# Patient Record
Sex: FEMALE | Race: OTHER | Employment: OTHER | URBAN - METROPOLITAN AREA
[De-identification: names, ages, dates, MRNs, and addresses within clinical notes are randomized per-mention and may not be internally consistent; named-entity substitution may affect disease eponyms.]

---

## 2021-09-16 ENCOUNTER — HOSPITAL ENCOUNTER (OUTPATIENT)
Age: 76
Setting detail: OBSERVATION
Discharge: HOME | End: 2021-09-18
Attending: EMERGENCY MEDICINE | Admitting: INTERNAL MEDICINE
Payer: MEDICAID

## 2021-09-16 ENCOUNTER — APPOINTMENT (OUTPATIENT)
Dept: CT IMAGING | Age: 76
End: 2021-09-16
Payer: MEDICAID

## 2021-09-16 ENCOUNTER — APPOINTMENT (OUTPATIENT)
Dept: GENERAL RADIOLOGY | Age: 76
End: 2021-09-16
Payer: MEDICAID

## 2021-09-16 DIAGNOSIS — R07.9 CHEST PAIN, UNSPECIFIED TYPE: ICD-10-CM

## 2021-09-16 DIAGNOSIS — I16.0 HYPERTENSIVE URGENCY: Primary | ICD-10-CM

## 2021-09-16 LAB
A/G RATIO: 1.2 (ref 1.1–2.2)
ALBUMIN SERPL-MCNC: 4.6 G/DL (ref 3.4–5)
ALP BLD-CCNC: 88 U/L (ref 40–129)
ALT SERPL-CCNC: 10 U/L (ref 10–40)
ANION GAP SERPL CALCULATED.3IONS-SCNC: 10 MMOL/L (ref 3–16)
AST SERPL-CCNC: 22 U/L (ref 15–37)
BACTERIA: ABNORMAL /HPF
BASOPHILS ABSOLUTE: 0 K/UL (ref 0–0.2)
BASOPHILS RELATIVE PERCENT: 0.9 %
BILIRUB SERPL-MCNC: 0.5 MG/DL (ref 0–1)
BILIRUBIN URINE: NEGATIVE
BLOOD, URINE: ABNORMAL
BUN BLDV-MCNC: 10 MG/DL (ref 7–20)
CALCIUM SERPL-MCNC: 9.4 MG/DL (ref 8.3–10.6)
CHLORIDE BLD-SCNC: 99 MMOL/L (ref 99–110)
CLARITY: CLEAR
CO2: 28 MMOL/L (ref 21–32)
COLOR: YELLOW
CREAT SERPL-MCNC: 0.7 MG/DL (ref 0.6–1.2)
EOSINOPHILS ABSOLUTE: 0.1 K/UL (ref 0–0.6)
EOSINOPHILS RELATIVE PERCENT: 2.4 %
EPITHELIAL CELLS, UA: ABNORMAL /HPF (ref 0–5)
GFR AFRICAN AMERICAN: >60
GFR NON-AFRICAN AMERICAN: >60
GLOBULIN: 3.7 G/DL
GLUCOSE BLD-MCNC: 98 MG/DL (ref 70–99)
GLUCOSE URINE: NEGATIVE MG/DL
HCT VFR BLD CALC: 42 % (ref 36–48)
HEMOGLOBIN: 14.1 G/DL (ref 12–16)
KETONES, URINE: NEGATIVE MG/DL
LEUKOCYTE ESTERASE, URINE: NEGATIVE
LIPASE: 31 U/L (ref 13–60)
LYMPHOCYTES ABSOLUTE: 1.5 K/UL (ref 1–5.1)
LYMPHOCYTES RELATIVE PERCENT: 32.8 %
MCH RBC QN AUTO: 28.8 PG (ref 26–34)
MCHC RBC AUTO-ENTMCNC: 33.6 G/DL (ref 31–36)
MCV RBC AUTO: 85.7 FL (ref 80–100)
MICROSCOPIC EXAMINATION: YES
MONOCYTES ABSOLUTE: 0.6 K/UL (ref 0–1.3)
MONOCYTES RELATIVE PERCENT: 13 %
NEUTROPHILS ABSOLUTE: 2.3 K/UL (ref 1.7–7.7)
NEUTROPHILS RELATIVE PERCENT: 50.9 %
NITRITE, URINE: POSITIVE
PDW BLD-RTO: 13.4 % (ref 12.4–15.4)
PH UA: 7 (ref 5–8)
PLATELET # BLD: 270 K/UL (ref 135–450)
PMV BLD AUTO: 8.6 FL (ref 5–10.5)
POTASSIUM REFLEX MAGNESIUM: 3.9 MMOL/L (ref 3.5–5.1)
PRO-BNP: 230 PG/ML (ref 0–449)
PROTEIN UA: NEGATIVE MG/DL
RBC # BLD: 4.89 M/UL (ref 4–5.2)
RBC UA: ABNORMAL /HPF (ref 0–4)
SODIUM BLD-SCNC: 137 MMOL/L (ref 136–145)
SPECIFIC GRAVITY UA: 1.01 (ref 1–1.03)
TOTAL PROTEIN: 8.3 G/DL (ref 6.4–8.2)
TROPONIN: <0.01 NG/ML
URINE REFLEX TO CULTURE: ABNORMAL
URINE TYPE: ABNORMAL
UROBILINOGEN, URINE: 0.2 E.U./DL
WBC # BLD: 4.6 K/UL (ref 4–11)
WBC UA: ABNORMAL /HPF (ref 0–5)

## 2021-09-16 PROCEDURE — 6370000000 HC RX 637 (ALT 250 FOR IP): Performed by: EMERGENCY MEDICINE

## 2021-09-16 PROCEDURE — 85025 COMPLETE CBC W/AUTO DIFF WBC: CPT

## 2021-09-16 PROCEDURE — 71045 X-RAY EXAM CHEST 1 VIEW: CPT

## 2021-09-16 PROCEDURE — 93005 ELECTROCARDIOGRAM TRACING: CPT | Performed by: EMERGENCY MEDICINE

## 2021-09-16 PROCEDURE — 99284 EMERGENCY DEPT VISIT MOD MDM: CPT

## 2021-09-16 PROCEDURE — 70450 CT HEAD/BRAIN W/O DYE: CPT

## 2021-09-16 PROCEDURE — 83880 ASSAY OF NATRIURETIC PEPTIDE: CPT

## 2021-09-16 PROCEDURE — 80053 COMPREHEN METABOLIC PANEL: CPT

## 2021-09-16 PROCEDURE — 84484 ASSAY OF TROPONIN QUANT: CPT

## 2021-09-16 PROCEDURE — 81001 URINALYSIS AUTO W/SCOPE: CPT

## 2021-09-16 PROCEDURE — 83690 ASSAY OF LIPASE: CPT

## 2021-09-16 RX ORDER — ASPIRIN 325 MG
325 TABLET ORAL ONCE
Status: COMPLETED | OUTPATIENT
Start: 2021-09-16 | End: 2021-09-16

## 2021-09-16 RX ORDER — NITROGLYCERIN 0.4 MG/1
0.4 TABLET SUBLINGUAL EVERY 5 MIN PRN
Status: DISCONTINUED | OUTPATIENT
Start: 2021-09-16 | End: 2021-09-18 | Stop reason: HOSPADM

## 2021-09-16 RX ADMIN — ASPIRIN 325 MG: 325 TABLET ORAL at 23:51

## 2021-09-16 ASSESSMENT — PAIN DESCRIPTION - LOCATION: LOCATION: ABDOMEN;CHEST;HEAD

## 2021-09-16 ASSESSMENT — PAIN DESCRIPTION - FREQUENCY: FREQUENCY: INTERMITTENT

## 2021-09-16 ASSESSMENT — PAIN DESCRIPTION - DESCRIPTORS: DESCRIPTORS: PRESSURE

## 2021-09-16 ASSESSMENT — PAIN SCALES - GENERAL: PAINLEVEL_OUTOF10: 7

## 2021-09-16 ASSESSMENT — PAIN DESCRIPTION - ORIENTATION: ORIENTATION: MID

## 2021-09-17 ENCOUNTER — TELEPHONE (OUTPATIENT)
Dept: CARDIOLOGY | Age: 76
End: 2021-09-17

## 2021-09-17 ENCOUNTER — APPOINTMENT (OUTPATIENT)
Dept: NUCLEAR MEDICINE | Age: 76
End: 2021-09-17
Payer: MEDICAID

## 2021-09-17 DIAGNOSIS — I48.91 ATRIAL FIBRILLATION, UNSPECIFIED TYPE (HCC): Primary | ICD-10-CM

## 2021-09-17 PROBLEM — R07.9 CHEST PAIN IN ADULT: Status: ACTIVE | Noted: 2021-09-17

## 2021-09-17 PROBLEM — I10 HTN (HYPERTENSION): Status: ACTIVE | Noted: 2021-09-17

## 2021-09-17 LAB
EKG ATRIAL RATE: 267 BPM
EKG ATRIAL RATE: 70 BPM
EKG DIAGNOSIS: NORMAL
EKG DIAGNOSIS: NORMAL
EKG P AXIS: 49 DEGREES
EKG P-R INTERVAL: 150 MS
EKG Q-T INTERVAL: 400 MS
EKG Q-T INTERVAL: 422 MS
EKG QRS DURATION: 82 MS
EKG QRS DURATION: 86 MS
EKG QTC CALCULATION (BAZETT): 403 MS
EKG QTC CALCULATION (BAZETT): 432 MS
EKG R AXIS: -46 DEGREES
EKG R AXIS: -50 DEGREES
EKG T AXIS: 178 DEGREES
EKG T AXIS: 61 DEGREES
EKG VENTRICULAR RATE: 55 BPM
EKG VENTRICULAR RATE: 70 BPM
LV EF: 75 %
LVEF MODALITY: NORMAL
TROPONIN: <0.01 NG/ML
TROPONIN: <0.01 NG/ML

## 2021-09-17 PROCEDURE — 93017 CV STRESS TEST TRACING ONLY: CPT

## 2021-09-17 PROCEDURE — 93010 ELECTROCARDIOGRAM REPORT: CPT | Performed by: INTERNAL MEDICINE

## 2021-09-17 PROCEDURE — G0378 HOSPITAL OBSERVATION PER HR: HCPCS

## 2021-09-17 PROCEDURE — 6370000000 HC RX 637 (ALT 250 FOR IP): Performed by: INTERNAL MEDICINE

## 2021-09-17 PROCEDURE — A9502 TC99M TETROFOSMIN: HCPCS | Performed by: INTERNAL MEDICINE

## 2021-09-17 PROCEDURE — 3430000000 HC RX DIAGNOSTIC RADIOPHARMACEUTICAL: Performed by: INTERNAL MEDICINE

## 2021-09-17 PROCEDURE — 78452 HT MUSCLE IMAGE SPECT MULT: CPT

## 2021-09-17 PROCEDURE — 96372 THER/PROPH/DIAG INJ SC/IM: CPT

## 2021-09-17 PROCEDURE — 2580000003 HC RX 258: Performed by: INTERNAL MEDICINE

## 2021-09-17 PROCEDURE — 6360000002 HC RX W HCPCS: Performed by: INTERNAL MEDICINE

## 2021-09-17 PROCEDURE — 84484 ASSAY OF TROPONIN QUANT: CPT

## 2021-09-17 PROCEDURE — 93005 ELECTROCARDIOGRAM TRACING: CPT | Performed by: INTERNAL MEDICINE

## 2021-09-17 PROCEDURE — 99244 OFF/OP CNSLTJ NEW/EST MOD 40: CPT | Performed by: INTERNAL MEDICINE

## 2021-09-17 RX ORDER — ONDANSETRON 4 MG/1
4 TABLET, ORALLY DISINTEGRATING ORAL EVERY 8 HOURS PRN
Status: DISCONTINUED | OUTPATIENT
Start: 2021-09-17 | End: 2021-09-18 | Stop reason: HOSPADM

## 2021-09-17 RX ORDER — SODIUM CHLORIDE 0.9 % (FLUSH) 0.9 %
5-40 SYRINGE (ML) INJECTION EVERY 12 HOURS SCHEDULED
Status: DISCONTINUED | OUTPATIENT
Start: 2021-09-17 | End: 2021-09-18 | Stop reason: HOSPADM

## 2021-09-17 RX ORDER — ACETAMINOPHEN 325 MG/1
650 TABLET ORAL EVERY 6 HOURS PRN
Status: DISCONTINUED | OUTPATIENT
Start: 2021-09-17 | End: 2021-09-18 | Stop reason: HOSPADM

## 2021-09-17 RX ORDER — POLYETHYLENE GLYCOL 3350 17 G/17G
17 POWDER, FOR SOLUTION ORAL DAILY PRN
Status: DISCONTINUED | OUTPATIENT
Start: 2021-09-17 | End: 2021-09-18 | Stop reason: HOSPADM

## 2021-09-17 RX ORDER — AMLODIPINE BESYLATE 5 MG/1
5 TABLET ORAL DAILY
Status: DISCONTINUED | OUTPATIENT
Start: 2021-09-17 | End: 2021-09-17

## 2021-09-17 RX ORDER — AMLODIPINE BESYLATE 5 MG/1
10 TABLET ORAL DAILY
Status: DISCONTINUED | OUTPATIENT
Start: 2021-09-18 | End: 2021-09-18 | Stop reason: HOSPADM

## 2021-09-17 RX ORDER — ATORVASTATIN CALCIUM 10 MG/1
40 TABLET, FILM COATED ORAL NIGHTLY
Status: DISCONTINUED | OUTPATIENT
Start: 2021-09-17 | End: 2021-09-18 | Stop reason: HOSPADM

## 2021-09-17 RX ORDER — ONDANSETRON 2 MG/ML
4 INJECTION INTRAMUSCULAR; INTRAVENOUS EVERY 6 HOURS PRN
Status: DISCONTINUED | OUTPATIENT
Start: 2021-09-17 | End: 2021-09-18 | Stop reason: HOSPADM

## 2021-09-17 RX ORDER — NITROGLYCERIN 0.4 MG/1
0.4 TABLET SUBLINGUAL EVERY 5 MIN PRN
Status: DISCONTINUED | OUTPATIENT
Start: 2021-09-17 | End: 2021-09-18 | Stop reason: HOSPADM

## 2021-09-17 RX ORDER — ASPIRIN 81 MG/1
81 TABLET, CHEWABLE ORAL DAILY
Status: DISCONTINUED | OUTPATIENT
Start: 2021-09-17 | End: 2021-09-17

## 2021-09-17 RX ORDER — ACETAMINOPHEN 650 MG/1
650 SUPPOSITORY RECTAL EVERY 6 HOURS PRN
Status: DISCONTINUED | OUTPATIENT
Start: 2021-09-17 | End: 2021-09-18 | Stop reason: HOSPADM

## 2021-09-17 RX ORDER — SODIUM CHLORIDE 9 MG/ML
25 INJECTION, SOLUTION INTRAVENOUS PRN
Status: DISCONTINUED | OUTPATIENT
Start: 2021-09-17 | End: 2021-09-18 | Stop reason: HOSPADM

## 2021-09-17 RX ORDER — SODIUM CHLORIDE 0.9 % (FLUSH) 0.9 %
5-40 SYRINGE (ML) INJECTION PRN
Status: DISCONTINUED | OUTPATIENT
Start: 2021-09-17 | End: 2021-09-18 | Stop reason: HOSPADM

## 2021-09-17 RX ADMIN — Medication 10 ML: at 09:19

## 2021-09-17 RX ADMIN — ATORVASTATIN CALCIUM 40 MG: 10 TABLET, FILM COATED ORAL at 20:11

## 2021-09-17 RX ADMIN — APIXABAN 5 MG: 5 TABLET, FILM COATED ORAL at 20:11

## 2021-09-17 RX ADMIN — AMLODIPINE BESYLATE 5 MG: 5 TABLET ORAL at 00:50

## 2021-09-17 RX ADMIN — REGADENOSON 0.4 MG: 0.08 INJECTION, SOLUTION INTRAVENOUS at 11:35

## 2021-09-17 RX ADMIN — ENOXAPARIN SODIUM 40 MG: 40 INJECTION SUBCUTANEOUS at 09:16

## 2021-09-17 RX ADMIN — AMLODIPINE BESYLATE 5 MG: 5 TABLET ORAL at 09:17

## 2021-09-17 RX ADMIN — NITROGLYCERIN 0.4 MG: 0.4 TABLET, ORALLY DISINTEGRATING SUBLINGUAL at 11:53

## 2021-09-17 RX ADMIN — Medication 10 ML: at 20:11

## 2021-09-17 RX ADMIN — ATORVASTATIN CALCIUM 40 MG: 10 TABLET, FILM COATED ORAL at 00:50

## 2021-09-17 RX ADMIN — TETROFOSMIN 10.9 MILLICURIE: 1.38 INJECTION, POWDER, LYOPHILIZED, FOR SOLUTION INTRAVENOUS at 10:35

## 2021-09-17 RX ADMIN — ACETAMINOPHEN 650 MG: 325 TABLET ORAL at 00:50

## 2021-09-17 RX ADMIN — TETROFOSMIN 32.3 MILLICURIE: 1.38 INJECTION, POWDER, LYOPHILIZED, FOR SOLUTION INTRAVENOUS at 11:35

## 2021-09-17 RX ADMIN — ASPIRIN 81 MG: 81 TABLET, CHEWABLE ORAL at 09:15

## 2021-09-17 ASSESSMENT — PAIN SCALES - GENERAL
PAINLEVEL_OUTOF10: 0
PAINLEVEL_OUTOF10: 7

## 2021-09-17 NOTE — ED PROVIDER NOTES
CHIEF COMPLAINT  Chest Pain (lower chest/epigastric pain began yesterday. Pain radiates to her back. Pt states pain feels like pressure. Pt did not use OTC meds at home. Pt denies cardiac hx. Pt states she has not been taking BP meds for about 2 month, pt ran out when she came back from [facility] and didn't fill it. ) and Headache (Headache began yesterday. )      HISTORY OF PRESENT Luis Johnson is a 78 y.o. female with a history of hypertension on amlodipine, Benzapril who presents emergency department for evaluation of chest pain. She is accompanied by her son who translates for her. According to the patient, she has had substernal chest pain with radiation towards the epigastric area for the past 2 days. There is no associated nausea, vomiting. There is no ripping or tearing component of the chest pain. Patient states that she has not been taking her blood pressure medications for the past 2 months because it ran out. She states that when the chest pain started yesterday she also has had left-sided headache. She states that there is associated blurred vision. .  Son states that there is no changes in her mental status or confusion. She denies any weakness in her extremities. She reports no previous cardiac history, CHF, stress testing of the heart. No other complaints, modifying factors or associated symptoms. I have reviewed the following from the nursing documentation. No past medical history on file. No past surgical history on file. No family history on file.   Social History     Socioeconomic History    Marital status:      Spouse name: Not on file    Number of children: Not on file    Years of education: Not on file    Highest education level: Not on file   Occupational History    Not on file   Tobacco Use    Smoking status: Not on file   Substance and Sexual Activity    Alcohol use: Not on file    Drug use: Not on file    Sexual activity: Not on file Other Topics Concern    Not on file   Social History Narrative    Not on file     Social Determinants of Health     Financial Resource Strain:     Difficulty of Paying Living Expenses:    Food Insecurity:     Worried About Running Out of Food in the Last Year:     [address] in the Last Year:    Transportation Needs:     Lack of Transportation (Medical):  Lack of Transportation (Non-Medical):    Physical Activity:     Days of Exercise per Week:     Minutes of Exercise per Session:    Stress:     Feeling of Stress :    Social Connections:     Frequency of Communication with Friends and Family:     Frequency of Social Gatherings with Friends and Family:     Attends Zoroastrianism Services:     Active Member of Clubs or Organizations:     Attends Club or Organization Meetings:     Marital Status:    Intimate Partner Violence:     Fear of Current or Ex-Partner:     Emotionally Abused:     Physically Abused:     Sexually Abused:      Current Facility-Administered Medications   Medication Dose Route Frequency Provider Last Rate Last Admin    nitroGLYCERIN (NITROSTAT) SL tablet 0.4 mg  0.4 mg SubLINGual Q5 Min PRN Tonia Ibarra MD        aspirin tablet 325 mg  325 mg Oral Once Tonia Ibarra MD         No current outpatient medications on file. No Known Allergies    REVIEW OF SYSTEMS  10 systems reviewed, pertinent positives per HPI otherwise noted to be negative. PHYSICAL EXAM  BP (!) 163/97   Pulse 60   Temp 97.2 °F (36.2 °C)   Resp 18   SpO2 99%   GENERAL APPEARANCE: Awake and alert. Cooperative. No acute distress. HEAD: Normocephalic. Atraumatic. EYES: PERRL. EOM's grossly intact. ENT: Mucous membranes are moist.   NECK: Supple, trachea midline. No significant lymphadenopathy  HEART: RRR. No harsh murmurs. Intact radial pulses 2+ bilaterally. LUNGS: Respirations unlabored without accessory muscle use. CTAB. Good air exchange. No wheezes, rales, or rhonchi.   Speaking comfortably in full sentences. ABDOMEN: Soft. Non-distended. Non-tender. No guarding or rebound. EXTREMITIES: No peripheral edema. No acute deformities. SKIN: Warm and dry. No acute rashes. NEUROLOGICAL: Alert and oriented X 3. CN II-XII intact. No gross facial drooping. Strength 5/5, sensation intact. No pronator drift. Normal coordination. Gait normal.   PSYCHIATRIC: Normal mood and affect. LABS  I have reviewed all labs for this visit.    Results for orders placed or performed during the hospital encounter of 09/16/21   CBC Auto Differential   Result Value Ref Range    WBC 4.6 4.0 - 11.0 K/uL    RBC 4.89 4.00 - 5.20 M/uL    Hemoglobin 14.1 12.0 - 16.0 g/dL    Hematocrit 42.0 36.0 - 48.0 %    MCV 85.7 80.0 - 100.0 fL    MCH 28.8 26.0 - 34.0 pg    MCHC 33.6 31.0 - 36.0 g/dL    RDW 13.4 12.4 - 15.4 %    Platelets 343 398 - 673 K/uL    MPV 8.6 5.0 - 10.5 fL    Neutrophils % 50.9 %    Lymphocytes % 32.8 %    Monocytes % 13.0 %    Eosinophils % 2.4 %    Basophils % 0.9 %    Neutrophils Absolute 2.3 1.7 - 7.7 K/uL    Lymphocytes Absolute 1.5 1.0 - 5.1 K/uL    Monocytes Absolute 0.6 0.0 - 1.3 K/uL    Eosinophils Absolute 0.1 0.0 - 0.6 K/uL    Basophils Absolute 0.0 0.0 - 0.2 K/uL   Comprehensive Metabolic Panel w/ Reflex to MG   Result Value Ref Range    Sodium 137 136 - 145 mmol/L    Potassium reflex Magnesium 3.9 3.5 - 5.1 mmol/L    Chloride 99 99 - 110 mmol/L    CO2 28 21 - 32 mmol/L    Anion Gap 10 3 - 16    Glucose 98 70 - 99 mg/dL    BUN 10 7 - 20 mg/dL    CREATININE 0.7 0.6 - 1.2 mg/dL    GFR Non-African American >60 >60    GFR African American >60 >60    Calcium 9.4 8.3 - 10.6 mg/dL    Total Protein 8.3 (H) 6.4 - 8.2 g/dL    Albumin 4.6 3.4 - 5.0 g/dL    Albumin/Globulin Ratio 1.2 1.1 - 2.2    Total Bilirubin 0.5 0.0 - 1.0 mg/dL    Alkaline Phosphatase 88 40 - 129 U/L    ALT 10 10 - 40 U/L    AST 22 15 - 37 U/L    Globulin 3.7 g/dL   Lipase   Result Value Ref Range    Lipase 31.0 13.0 - 60.0 U/L Troponin   Result Value Ref Range    Troponin <0.01 <0.01 ng/mL   Brain Natriuretic Peptide   Result Value Ref Range    Pro- 0 - 449 pg/mL   Urinalysis Reflex to Culture    Specimen: Urine, clean catch   Result Value Ref Range    Color, UA Yellow Straw/Yellow    Clarity, UA Clear Clear    Glucose, Ur Negative Negative mg/dL    Bilirubin Urine Negative Negative    Ketones, Urine Negative Negative mg/dL    Specific Gravity, UA 1.010 1.005 - 1.030    Blood, Urine SMALL (A) Negative    pH, UA 7.0 5.0 - 8.0    Protein, UA Negative Negative mg/dL    Urobilinogen, Urine 0.2 <2.0 E.U./dL    Nitrite, Urine POSITIVE (A) Negative    Leukocyte Esterase, Urine Negative Negative    Microscopic Examination YES     Urine Type NotGiven     Urine Reflex to Culture Not Indicated    Microscopic Urinalysis   Result Value Ref Range    WBC, UA 0-2 0 - 5 /HPF    RBC, UA 3-4 0 - 4 /HPF    Epithelial Cells, UA 0-1 0 - 5 /HPF    Bacteria, UA 2+ (A) None Seen /HPF       EKG  The Ekg interpreted by myself in the emergency department in the absence of a cardiologist.  normal sinus rhythm with a rate of 70  Axis is   Normal  QTc is  within an acceptable range  Intervals and Durations are unremarkable. No specific ST-T wave changes appreciated. There is nonspecific ST changes in leads aVL, 3, biphasic T waves in leads V3 through V6  No evidence of acute ischemia. No prior EKG available for comparison      RADIOLOGY  X-RAYS:  I have reviewed radiologic plain film image(s). ALL OTHER NON-PLAIN FILM IMAGES SUCH AS CT, ULTRASOUND AND MRI HAVE BEEN READ BY THE RADIOLOGIST. XR CHEST PORTABLE   Final Result   Mild cardiomegaly. Hypoinflation with no obvious acute pulmonary abnormality. CT Head WO Contrast   Final Result   Minimal atrophy and mild chronic microischemic disease in the deep white   matter with no acute abnormality seen. ED COURSE/MDM  Patient seen and evaluated. Old records reviewed.  Labs and imaging reviewed and results discussed with patient. This is a 57-year-old female presents emergency department for evaluation of hypertension, chest pain, blurred vision. Patient arrives with blood pressure elevated into the 190s. Repeat blood pressure when she was roomed is into the 200s. She is in no acute distress, no respiratory distress, no focal neurological deficits. ED evaluation included basic labs, cardiac panel, chest x-ray, CT head. Patient will receive full dose aspirin after head CT presuming that this is negative. Blood pressure improved without intervention to the 160s. Patient was given full dose aspirin. CT head was negative for acute intracranial process. Patient's heart score is moderately elevated at 5 secondary to age, risk factors, nonspecific EKG changes. Patient's chest pain is actually gone at this point in time and I have recommended patient to be admitted for continued evaluation for hypertensive urgency, chest pain rule out. Patient family agreeable with admission. CLINICAL IMPRESSION  1. Hypertensive urgency    2. Chest pain, unspecified type        Blood pressure (!) 163/97, pulse 60, temperature 97.2 °F (36.2 °C), resp. rate 18, SpO2 99 %. DISPOSITION  Alicia Ibarra was admitted in stable condition.       Rachna Villavicencio MD  09/16/21 7932

## 2021-09-17 NOTE — H&P
Hospital Medicine History & Physical      Patient:  Harris Cruz  :   1945  MRN:   [id number]  Date of Service: 21    Chief Complaint   Patient presents with    Chest Pain     lower chest/epigastric pain began yesterday. Pain radiates to her back. Pt states pain feels like pressure. Pt did not use OTC meds at home. Pt denies cardiac hx. Pt states she has not been taking BP meds for about 2 month, pt ran out when she came back from [place] and didn't fill it.  Headache     Headache began yesterday. HISTORY OF PRESENT ILLNESS:     Harris Cruz is a 78 y.o. female. She presents from home via private vehicle. She is [place] and speaks only Western Shayy. Her son accompanies her and assists with translation. She traveled to [facility] and returned two months ago. She has not been taking her blood pressure medications since returning. She relates she ran out because prior to her trip she lived in [place]. She was treated for HTN there but cannot recall the names of the medications she was taking at that time. She has been experiencing intermittent epigastric and substernal discomfort for the past 2 days. She does not think there has been variation with activity vs rest, breathing, or swallowing. She denies dysnpea, orthopnea, and PND. She does relate at times having a headache over the past 2 days associated with blurry vision. Review of Systems:  All pertinent positives and negatives are as noted in the HPI section. All other systems were reviewed and are negative. Past Medical History  HTN    History reviewed. No pertinent surgical history. Prior to Admission medications    Not on File   Not taking any medications    Allergies:   Patient has no known allergies. Social:  Does not smoke. Does not drink alcohol or abuse illicit drugs. History reviewed. No pertinent family history.     PHYSICAL EXAM:  I performed this physical intravenous contrast. Dose modulation, iterative reconstruction, and/or weight based adjustment of the mA/kV was utilized to reduce the radiation dose to as low as reasonably achievable. COMPARISON: None. HISTORY: ORDERING SYSTEM PROVIDED HISTORY: HTN, headache TECHNOLOGIST PROVIDED HISTORY: Reason for exam:->HTN, headache Has a \"code stroke\" or \"stroke alert\" been called? ->No Decision Support Exception - unselect if not a suspected or confirmed emergency medical condition->Emergency Medical Condition (MA) Reason for Exam: HTN, headache FINDINGS: BRAIN/VENTRICLES: The ventricles are borderline enlarged there is diffuse mild prominence of the cortical sulci. There is mild periventricular low density bilaterally. No intracranial hemorrhage or edema is seen. There is no extra-axial fluid collection or mass. The midline structures unremarkable ORBITS: The visualized portion of the orbits demonstrate no acute abnormality. SINUSES: The visualized paranasal sinuses and mastoid air cells demonstrate no acute abnormality. SOFT TISSUES/SKULL:  No acute abnormality of the visualized skull or soft tissues. Minimal atrophy and mild chronic microischemic disease in the deep white matter with no acute abnormality seen. XR CHEST PORTABLE    Result Date: 9/16/2021  EXAMINATION: ONE XRAY VIEW OF THE CHEST 9/16/2021 9:41 pm COMPARISON: None. HISTORY: ORDERING SYSTEM PROVIDED HISTORY: chest pain TECHNOLOGIST PROVIDED HISTORY: Reason for exam:->chest pain Reason for Exam: cp Acuity: Acute Type of Exam: Initial FINDINGS: The heart is mildly enlarged. The pulmonary vessels are normal.  The lungs are hypoinflated. No obvious consolidation or effusion is seen. The bones are intact. Mild cardiomegaly. Hypoinflation with no obvious acute pulmonary abnormality. I directly reviewed all recent imaging studies as well as pertinent prior studies. Radiology reports may or may not be available at the time of my review. EKG:  New and pertinent prior tracings were directly reviewed. My interpretation is as follows:  Normal sinus. Borderline LAFB. Active Hospital Problems    Diagnosis Date Noted    Chest pain in adult [R07.9] 09/17/2021    HTN (hypertension) [I10] 09/17/2021       ASSESSMENT & PLAN  Chest Pain  -  Trend out troponins and plan for exercise MPI stress in the morning.  -  Start ASA and statin. Uncontrolled HTN  -  Start amlodipine. DVT prophylaxis: SCDs, lovenox  Code Status:  Full  Disposition:  Observation. Anticipate d/c to home in 1-2 days.     Kalia Sanders MD MD

## 2021-09-17 NOTE — ED NOTES
Pts Son Angelica Nielson departing from ED at this time. Requests to be contacted @ [phone] at any change in condition, as well as prior to any procedures.       Dawn Brower RN  09/17/21 0114       Dawn Brower RN  09/17/21 9123

## 2021-09-17 NOTE — PROGRESS NOTES
Hospitalist Progress Note      PCP: No primary care provider on file. Date of Admission: 9/16/2021    Chief Complaint: Chest pain    Hospital Course: This is 24-year-old female admitted with chest pain. She is hiatian and speaks Western Shayy only, history of hypertension noncompliant. In the emergency room patient was noted to have elevated blood pressure. Subjective: Patient is lying in the bed comfortable denies any chest pain or shortness of breath no nausea vomiting. Medications:  Reviewed    Infusion Medications    sodium chloride       Scheduled Medications    sodium chloride flush  5-40 mL IntraVENous 2 times per day    aspirin  81 mg Oral Daily    atorvastatin  40 mg Oral Nightly    enoxaparin  40 mg SubCUTAneous Daily    amLODIPine  5 mg Oral Daily     PRN Meds: sodium chloride flush, sodium chloride, ondansetron **OR** ondansetron, acetaminophen **OR** acetaminophen, polyethylene glycol, nitroGLYCERIN, nitroGLYCERIN    No intake or output data in the 24 hours ending 09/17/21 0733    Physical Exam Performed:    /84   Pulse (!) 41   Temp 97.2 °F (36.2 °C)   Resp 17   SpO2 97%     General appearance: No apparent distress, appears stated age and cooperative. HEENT: Pupils equal, round, and reactive to light. Conjunctivae/corneas clear. Neck: Supple, with full range of motion. No jugular venous distention. Trachea midline. Respiratory:  Normal respiratory effort. Clear to auscultation, bilaterally without Rales/Wheezes/Rhonchi. Cardiovascular: Regular rate and rhythm with normal S1/S2 without murmurs, rubs or gallops. Abdomen: Soft, non-tender, non-distended with normal bowel sounds. Musculoskeletal: No clubbing, cyanosis or edema bilaterally. Full range of motion without deformity. Skin: Skin color, texture, turgor normal.  No rashes or lesions. Neurologic:  Neurovascularly intact without any focal sensory/motor deficits.  Cranial nerves: II-XII intact, grossly non-focal.  Psychiatric: Alert and oriented, thought content appropriate, normal insight  Capillary Refill: Brisk,3 seconds, normal   Peripheral Pulses: +2 palpable, equal bilaterally       Labs:   Recent Labs     09/16/21 2038   WBC 4.6   HGB 14.1   HCT 42.0        Recent Labs     09/16/21 2038      K 3.9   CL 99   CO2 28   BUN 10   CREATININE 0.7   CALCIUM 9.4     Recent Labs     09/16/21 2038   AST 22   ALT 10   BILITOT 0.5   ALKPHOS 88     No results for input(s): INR in the last 72 hours. Recent Labs     09/16/21 2038 09/17/21  0059 09/17/21  0303   TROPONINI <0.01 <0.01 <0.01       Urinalysis:      Lab Results   Component Value Date    NITRU POSITIVE 09/16/2021    WBCUA 0-2 09/16/2021    BACTERIA 2+ 09/16/2021    RBCUA 3-4 09/16/2021    BLOODU SMALL 09/16/2021    SPECGRAV 1.010 09/16/2021    GLUCOSEU Negative 09/16/2021       Radiology:  XR CHEST PORTABLE   Final Result   Mild cardiomegaly. Hypoinflation with no obvious acute pulmonary abnormality. CT Head WO Contrast   Final Result   Minimal atrophy and mild chronic microischemic disease in the deep white   matter with no acute abnormality seen. NM Cardiac Stress Test Nuclear Imaging    (Results Pending)           Assessment/Plan:    Active Hospital Problems    Diagnosis     Chest pain in adult [R07.9]     HTN (hypertension) [I10]      1. This is a 72-year-old female admitted with chest pain continue with aspirin beta-blocker nitroglycerin scheduled for stress test.  2.  Uncontrolled hypertension on admission started on amlodipine continue to monitor adjust medication as needed.     DVT Prophylaxis: Lovenox subcu  Diet: Diet NPO Exceptions are: Ice Chips, Sips of Water with Meds  Diet NPO Exceptions are: [employer], Sips of Water with Meds  Code Status: Full Code    PT/OT Eval Status:     Asya Del Real MD

## 2021-09-17 NOTE — TELEPHONE ENCOUNTER
Monitor placed by Floor nurse/Bee COTTON  Monitor company [employer]  Length of monitor 14 days  Monitor ordered by [facility]  Serial number [id number]  Kit ID [id number]  Activation successful prior to pt leaving office?  Yes- floor nurse will place upon discharge

## 2021-09-17 NOTE — PLAN OF CARE
Problem: Infection:  Goal: Will remain free from infection  Description: Will remain free from infection  Outcome: Ongoing     Problem: Safety:  Goal: Free from accidental physical injury  Description: Free from accidental physical injury  Outcome: Ongoing     Problem: Daily Care:  Goal: Daily care needs are met  Description: Daily care needs are met  Outcome: Ongoing

## 2021-09-17 NOTE — CONSULTS
Consult placed    Who:CARDIOLOGY, [facility]  Date:9/17/2021,  Time:1:25 PM        Electronically signed by Marta Laureano on 9/17/2021 at 1:25 PM

## 2021-09-17 NOTE — PROGRESS NOTES
Pt in A-fib. 75 rate. /60. After test. 1 Nitro sl given and pt c/o of feeling bad and blurred vision. DR. Crystal Patricia at bedside. Son at bedside.

## 2021-09-17 NOTE — ED NOTES
Bedside report given to [facility]. [facility] called for verification pt on tele. Patient transported to B3 via wheelchair. All personal belongings sent with patient to the floor.       Jayme Charlton, [place]  09/17/21 5218

## 2021-09-17 NOTE — CONSULTS
AGRATIO 1.2 09/16/2021    LABGLOM >60 09/16/2021    GLUCOSE 98 09/16/2021    PROT 8.3 09/16/2021    CALCIUM 9.4 09/16/2021    BILITOT 0.5 09/16/2021    ALKPHOS 88 09/16/2021    AST 22 09/16/2021    ALT 10 09/16/2021     PT/INR:  No results found for: PTINR  HgBA1c:No results found for: LABA1C  Lab Results   Component Value Date    TROPONINI <0.01 09/17/2021         Cardiac Data     Last EKG: Normal sinus rhythm, left axis, poor R wave progression on admission    Echo:    Stress Test:    Cath:    Studies:     cxr       Impression   Mild cardiomegaly.       Hypoinflation with no obvious acute pulmonary abnormality. I have reviewed labs and imaging/xray/diagnostic testing in this note. Assessment and Plan          Patient Active Problem List   Diagnosis    Chest pain in adult    HTN (hypertension)       Chest pain, evaluate further with stress test, that test result is pending, will also order echocardiogram.  Consider cardiac catheterization if there are high risk features. Continue aspirin. Possible A. fib, obtain follow-up EKG, will need to also review stress EKGs. We will also arrange for cardiac event monitor at discharge and will arrange for outpatient follow-up in our office. Hypertension, uncontrolled, this is likely due to patient running out of her medications, have requested from the patient's son that the family bring a list of patient's past medical history as well as medications and other doctors she may have seen. She may have seen doctors in [place] and we would like to obtain those records to determine what medications may be best for her. For now continue with amlodipine. Possible hypercholesterolemia, continue statin      Addend: stress test ekgs showed pafib, await f/u ekg and will consider eliquis pending review of additional records from family, will need to discuss pros/cons of AC/eliquis with them before starting.   If in NSR currently, likely can defer that pending cardiac event monitor and outpt f/u. Addend, f/u ekg shows afib, will start eliquis 5mg po bid, continue this at dc if family comfortable with anticoagulation    If pt/family comfortable with above plan, ok to dc today from cardiac perspective, plan on cardiac event monitor at dc and oupt f/u    Thank you for allowing us to participate in the care of 44 [address]. Please call me with any questions [phone].     Jacquelyn Prabhakar MD, [facility]   Interventional Cardiologist  [facility]  [phone] [address] [facility]  [phone] [address] [place]  9/17/2021 1:39 PM

## 2021-09-17 NOTE — PROGRESS NOTES
Notified md of recent rapid response called to pt.'s room during stress test, per cmu pt. Showing signs of bigeminy and Afib.     pt. was in stress and became unconscious, bp jumping from 98/46 to 217/65, trigeminal bigeminy noted with AFib while at stress, need cards consult and recommendations for bp control?

## 2021-09-17 NOTE — PROGRESS NOTES
4 Eyes Skin Assessment     The patient is being assess for   Admission    I agree that 2 RN's have performed a thorough Head to Toe Skin Assessment on the patient. ALL assessment sites listed below have been assessed. Areas assessed for pressure by both nurses:   [x]   Head, Face, and Ears   [x]   Shoulders, Back, and Chest, Abdomen  [x]   Arms, Elbows, and Hands   [x]   Coccyx, Sacrum, and Ischium  [x]   Legs, Feet, and Heels        Skin Assessed Under all Medical Devices by both nurses:  none              All Mepilex Borders were peeled back and area peeked at by both nurses:  Yes  Please list where Mepilex Borders are located:  none             **SHARE this note so that the co-signing nurse is able to place an eSignature**    Co-signer eSignature: Electronically signed by German Miller RN on 9/17/21 at 6:16 PM EDT    Does the Patient have Skin Breakdown related to pressure?   No              Alexandro Prevention initiated:  NA   Wound Care Orders initiated:  NA      WOC nurse consulted for Pressure Injury (Stage 3,4, Unstageable, DTI, NWPT, Complex wounds)and New or Established Ostomies:  NA      Primary Nurse eSignature: Electronically signed by Ivan Leos RN on 9/17/21 at 9:44 AM EDT

## 2021-09-18 VITALS
OXYGEN SATURATION: 98 % | HEART RATE: 67 BPM | RESPIRATION RATE: 18 BRPM | WEIGHT: 110.1 LBS | TEMPERATURE: 97.9 F | SYSTOLIC BLOOD PRESSURE: 151 MMHG | DIASTOLIC BLOOD PRESSURE: 98 MMHG

## 2021-09-18 LAB
EKG ATRIAL RATE: 71 BPM
EKG DIAGNOSIS: NORMAL
EKG P AXIS: 37 DEGREES
EKG P-R INTERVAL: 156 MS
EKG Q-T INTERVAL: 336 MS
EKG QRS DURATION: 82 MS
EKG QTC CALCULATION (BAZETT): 365 MS
EKG R AXIS: -46 DEGREES
EKG T AXIS: 35 DEGREES
EKG VENTRICULAR RATE: 71 BPM
LV EF: 58 %
LVEF MODALITY: NORMAL

## 2021-09-18 PROCEDURE — 2580000003 HC RX 258: Performed by: INTERNAL MEDICINE

## 2021-09-18 PROCEDURE — 93005 ELECTROCARDIOGRAM TRACING: CPT | Performed by: INTERNAL MEDICINE

## 2021-09-18 PROCEDURE — 93010 ELECTROCARDIOGRAM REPORT: CPT | Performed by: INTERNAL MEDICINE

## 2021-09-18 PROCEDURE — 6370000000 HC RX 637 (ALT 250 FOR IP): Performed by: HOSPITALIST

## 2021-09-18 PROCEDURE — 6370000000 HC RX 637 (ALT 250 FOR IP): Performed by: INTERNAL MEDICINE

## 2021-09-18 PROCEDURE — 93306 TTE W/DOPPLER COMPLETE: CPT

## 2021-09-18 PROCEDURE — G0378 HOSPITAL OBSERVATION PER HR: HCPCS

## 2021-09-18 PROCEDURE — 99205 OFFICE O/P NEW HI 60 MIN: CPT | Performed by: INTERNAL MEDICINE

## 2021-09-18 RX ORDER — ATORVASTATIN CALCIUM 40 MG/1
40 TABLET, FILM COATED ORAL NIGHTLY
Qty: 30 TABLET | Refills: 3 | Status: SHIPPED | OUTPATIENT
Start: 2021-09-18 | End: 2021-10-08 | Stop reason: SDUPTHER

## 2021-09-18 RX ORDER — AMLODIPINE BESYLATE 10 MG/1
10 TABLET ORAL DAILY
Qty: 30 TABLET | Refills: 3 | Status: SHIPPED | OUTPATIENT
Start: 2021-09-19 | End: 2021-10-08 | Stop reason: SDUPTHER

## 2021-09-18 RX ADMIN — Medication 10 ML: at 09:10

## 2021-09-18 RX ADMIN — AMLODIPINE BESYLATE 10 MG: 5 TABLET ORAL at 09:09

## 2021-09-18 RX ADMIN — APIXABAN 5 MG: 5 TABLET, FILM COATED ORAL at 09:09

## 2021-09-18 ASSESSMENT — PAIN SCALES - GENERAL
PAINLEVEL_OUTOF10: 0

## 2021-09-18 ASSESSMENT — PAIN SCALES - WONG BAKER: WONGBAKER_NUMERICALRESPONSE: 0

## 2021-09-18 NOTE — PROGRESS NOTES
Neurovascularly intact without any focal sensory/motor deficits. Cranial nerves: II-XII intact, grossly non-focal.  Psychiatric: Alert and oriented, thought content appropriate, normal insight  Capillary Refill: Brisk,3 seconds, normal   Peripheral Pulses: +2 palpable, equal bilaterally       Labs:   Recent Labs     09/16/21 2038   WBC 4.6   HGB 14.1   HCT 42.0        Recent Labs     09/16/21 2038      K 3.9   CL 99   CO2 28   BUN 10   CREATININE 0.7   CALCIUM 9.4     Recent Labs     09/16/21 2038   AST 22   ALT 10   BILITOT 0.5   ALKPHOS 88     No results for input(s): INR in the last 72 hours. Recent Labs     09/16/21 2038 09/17/21  0059 09/17/21  0303   TROPONINI <0.01 <0.01 <0.01       Urinalysis:      Lab Results   Component Value Date    NITRU POSITIVE 09/16/2021    WBCUA 0-2 09/16/2021    BACTERIA 2+ 09/16/2021    RBCUA 3-4 09/16/2021    BLOODU SMALL 09/16/2021    SPECGRAV 1.010 09/16/2021    GLUCOSEU Negative 09/16/2021       Radiology:  NM Cardiac Stress Test Nuclear Imaging   Final Result      XR CHEST PORTABLE   Final Result   Mild cardiomegaly. Hypoinflation with no obvious acute pulmonary abnormality. CT Head WO Contrast   Final Result   Minimal atrophy and mild chronic microischemic disease in the deep white   matter with no acute abnormality seen. Assessment/Plan:    Active Hospital Problems    Diagnosis     Chest pain in adult [R07.9]     HTN (hypertension) [I10]         1. This is a 44-year-old female admitted with chest pain continue with aspirin beta-blocker nitroglycerin, stress test on 9/17/2021. Summary    Normal myocardial perfusion study with normal left ventricular function,    size, and wall motion.    The estimated left ventricular function is 75%. 2.  Uncontrolled hypertension on admission started on amlodipine continue to monitor adjust medication as needed. Today blood pressure under control.   3.  Overnight patient heart rate in 30s to 40s asymptomatic we will consult cardiology.     DVT Prophylaxis: Lovenox subcu  Diet: Diet NPO Exceptions are: Ice Chips, Sips of Water with Meds  Code Status: Full Code    PT/OT Eval Status:     Danii Braxton MD

## 2021-09-18 NOTE — PROGRESS NOTES
0230 pt is bradycardic HR is 39 and asymptomatic. On call physician notified and no new ordered at this time.

## 2021-09-18 NOTE — PLAN OF CARE
Problem: Infection:  Goal: Will remain free from infection  Description: Will remain free from infection  9/18/2021 0209 by Kadi Madsen RN  Outcome: Ongoing  9/17/2021 1553 by Ash Ward RN  Outcome: Ongoing     Problem: Safety:  Goal: Free from accidental physical injury  Description: Free from accidental physical injury  9/18/2021 0209 by Kadi Madsen RN  Outcome: Ongoing  9/17/2021 1553 by Ash Ward RN  Outcome: Ongoing  Goal: Free from intentional harm  Description: Free from intentional harm  Outcome: Ongoing     Problem: Daily Care:  Goal: Daily care needs are met  Description: Daily care needs are met  9/18/2021 0209 by Kadi Madsen RN  Outcome: Ongoing  9/17/2021 1553 by Ash Ward RN  Outcome: Ongoing     Problem: Pain:  Goal: Patient's pain/discomfort is manageable  Description: Patient's pain/discomfort is manageable  Outcome: Ongoing     Problem: Skin Integrity:  Goal: Skin integrity will stabilize  Description: Skin integrity will stabilize  Outcome: Ongoing     Problem: Discharge Planning:  Goal: Patients continuum of care needs are met  Description: Patients continuum of care needs are met  Outcome: Ongoing     Problem: Falls - Risk of:  Goal: Will remain free from falls  Description: Will remain free from falls  Outcome: Ongoing  Goal: Absence of physical injury  Description: Absence of physical injury  Outcome: Ongoing

## 2021-09-18 NOTE — DISCHARGE SUMMARY
Hospital Medicine Discharge Summary    Patient ID: Azra Davis      Patient's PCP: No primary care provider on file. Admit Date: 9/16/2021     Discharge Date: 9/18/2021     Admitting Physician: Sabas Lord MD     Discharge Physician: Cuba Woodson MD     Discharge Diagnoses: Active Hospital Problems    Diagnosis     Chest pain in adult [R07.9]     HTN (hypertension) [I10]        The patient was seen and examined on day of discharge and this discharge summary is in conjunction with any daily progress note from day of discharge. Hospital Course:   1.  This is a 72-year-old female admitted with chest pain continue with aspirin beta-blocker nitroglycerin, stress test on 9/17/2021. Summary    Normal myocardial perfusion study with normal left ventricular function,    size, and wall motion.    The estimated left ventricular function is 75%.         2.  Uncontrolled hypertension on admission started on amlodipine continue to monitor adjust medication as needed. Today blood pressure under control. 3.  Overnight patient heart rate in 30s to 40s asymptomatic we will consult cardiology. Patient was seen evaluated by cardiology new onset of atrial fibrillation recommended continue with the Eliquis 5 mg 1 p.o. twice daily. Recommended 2 weeks monitor at the time of the discharge, no antonio agent atrial fibrillation with a slow response despite normal AVN function patient to follow-up with cardiology as instructed. Outpatient referral to pulmonology for sleep apnea testing. Physical Exam Performed:     BP (!) 151/98   Pulse 67   Temp 97.9 °F (36.6 °C) (Oral)   Resp 18   Wt 110 lb 1.6 oz (49.9 kg)   SpO2 98%       General appearance:  No apparent distress, appears stated age and cooperative. HEENT:  Normal cephalic, atraumatic without obvious deformity. Pupils equal, round, and reactive to light. Extra ocular muscles intact. Conjunctivae/corneas clear.   Neck: Supple, with full range of motion. No jugular venous distention. Trachea midline. Respiratory:  Normal respiratory effort. Clear to auscultation, bilaterally without Rales/Wheezes/Rhonchi. Cardiovascular:  Regular rate and rhythm with normal S1/S2 without murmurs, rubs or gallops. Abdomen: Soft, non-tender, non-distended with normal bowel sounds. Musculoskeletal:  No clubbing, cyanosis or edema bilaterally. Full range of motion without deformity. Skin: Skin color, texture, turgor normal.  No rashes or lesions. Neurologic:  Neurovascularly intact without any focal sensory/motor deficits. Cranial nerves: II-XII intact, grossly non-focal.  Psychiatric:  Alert and oriented, thought content appropriate, normal insight  Capillary Refill: Brisk,< 3 seconds   Peripheral Pulses: +2 palpable, equal bilaterally       Labs: For convenience and continuity at follow-up the following most recent labs are provided:      CBC:    Lab Results   Component Value Date    WBC 4.6 09/16/2021    HGB 14.1 09/16/2021    HCT 42.0 09/16/2021     09/16/2021       Renal:    Lab Results   Component Value Date     09/16/2021    K 3.9 09/16/2021    CL 99 09/16/2021    CO2 28 09/16/2021    BUN 10 09/16/2021    CREATININE 0.7 09/16/2021    CALCIUM 9.4 09/16/2021         Significant Diagnostic Studies    Radiology:   NM Cardiac Stress Test Nuclear Imaging   Final Result      XR CHEST PORTABLE   Final Result   Mild cardiomegaly. Hypoinflation with no obvious acute pulmonary abnormality. CT Head WO Contrast   Final Result   Minimal atrophy and mild chronic microischemic disease in the deep white   matter with no acute abnormality seen. Consults:     IP CONSULT TO HOSPITALIST  IP CONSULT TO CARDIOLOGY  IP CONSULT TO CARDIOLOGY    Disposition: Home    Condition at Discharge: Stable    Discharge Instructions/Follow-up:  With cardiology as instructed    Code Status:  Full Code     Activity: activity as tolerated    Diet: Regular      Discharge Medications:     Discharge Medication List as of 9/18/2021  5:24 PM           Details   apixaban (ELIQUIS) 5 MG TABS tablet Take 1 tablet by mouth 2 times daily, Disp-60 tablet, R-2Print      atorvastatin (LIPITOR) 40 MG tablet Take 1 tablet by mouth nightly, Disp-30 tablet, R-3Print      amLODIPine (NORVASC) 10 MG tablet Take 1 tablet by mouth daily, Disp-30 tablet, R-3Print             Time Spent on discharge is more than 35 minutes in the examination, evaluation, counseling and review of medications and discharge plan. Signed:    Mahogany Salinas MD   9/18/2021      Thank you No primary care provider on file. for the opportunity to be involved in this patient's care. If you have any questions or concerns please feel free to contact me at [phone].

## 2021-09-18 NOTE — CONSULTS
Electrophysiology Consultation   Date: 9/18/2021  Admit Date:  9/16/2021  Reason for Consultation: Atrial fibrillation and nocturnal bradycardia  Consult Requesting Physician: Jameson Lew MD     Chief Complaint   Patient presents with    Chest Pain     lower chest/epigastric pain began yesterday. Pain radiates to her back. Pt states pain feels like pressure. Pt did not use OTC meds at home. Pt denies cardiac hx. Pt states she has not been taking BP meds for about 2 month, pt ran out when she came back from [place] and didn't fill it.  Headache     Headache began yesterday. HPI:   Mrs. Kaila Torres is a pleasant ([address]) 78year old female with a medical history significant for malignant hypertension who presents from home with hypertensive urgency complicated by chest pain. According to pateint and her family she has been having intermittent chest pain. [facility] when her chest pain didn't bebo. She was evaluated by my partner, Dr. Saintclair Murdoch who ordered a stress test and during which patient went into atrial fibrillation with slow ventricular response and had some symptomatic malignant hypertension that was treated with sublingual nitroglycerin. Of note, her nuclear stress test was reassuring. Overnight patient had some bradycardia down to the 30 and 40s and was asymptomatic. History reviewed. No pertinent past medical history. History reviewed. No pertinent surgical history. No Known Allergies    Social History:  Reviewed. Family History:  Reviewed. family history is not on file. No premature CAD. Review of System:  All other systems reviewed except for that noted above.  Pertinent negatives and positives are:     · General: negative for fever, chills   · Ophthalmic ROS: negative for - eye pain or loss of vision  · ENT ROS: negative for - headaches, sore throat   · Respiratory: negative for - cough, sputum  · Cardiovascular: Reviewed 0.7     Recent Labs     09/16/21 2038   WBC 4.6   HGB 14.1   HCT 42.0   MCV 85.7        Lab Results   Component Value Date    TROPONINI <0.01 09/17/2021     No results found for: BNP  No results found for: PROTIME, INR  No results found for: CHOL, HDL, TRIG    Diagnostic and imaging results reviewed. ECG: Atrial fibrillation with slow ventricular response. Echo: None. Nuclear Stress Test: 09/18/2021   Summary    Normal myocardial perfusion study with normal left ventricular function,    size, and wall motion.    The estimated left ventricular function is 75%. I independently reviewed the ECG and telemetry. Scheduled Meds:   sodium chloride flush  5-40 mL IntraVENous 2 times per day    atorvastatin  40 mg Oral Nightly    amLODIPine  10 mg Oral Daily    apixaban  5 mg Oral BID     Continuous Infusions:   sodium chloride       PRN Meds:.sodium chloride flush, sodium chloride, ondansetron **OR** ondansetron, acetaminophen **OR** acetaminophen, polyethylene glycol, nitroGLYCERIN, perflutren lipid microspheres, nitroGLYCERIN     Assessment:   Patient Active Problem List    Diagnosis Date Noted    Chest pain in adult 09/17/2021    HTN (hypertension) 09/17/2021      Active Hospital Problems    Diagnosis Date Noted    Chest pain in adult [R07.9] 09/17/2021    HTN (hypertension) [I10] 09/17/2021     Mrs. Hill Chavez is a pleasant ([facility]) 78year old female with a medical history significant for malignant hypertension who presents from home with hypertensive urgency complicated by chest pain. Problem List:  1. New onset paroxysmal fibrillation. 2. Malignant hypertension/hypertensive urgency. Assessment and Plan:  1. New onset paroxysmal fibrillation. Patient is a pleasant 81-year-old female with a medical history significant for hypertension who presented from home with hypertensive urgency and chest pain. Subsequently was found to have new onset atrial fibrillation.   There was a language barrier education with patient however we will did discuss atrial fibrillation in detail as per below with  service device. Afib risk factors including age, HTN, obesity, inactivity and TERRANCE were discussed with patient. Risk factor modification recommended. Patient's [id number] score is 2 with a stroke risk of 2.2%. We discussed oral anticoagulation to decrease the risk of thromboembolic events including stroke. Benefits and alternatives were discussed with patient. Risk of bleeding was discussed. Patient verbalized understanding. Different forms of anticoagulants including warfarin (Coumadin), Dabigatran (Pradaxa), Rivaroxaban (Xarelto), and Apixaban (Eliquis) were discussed. Patient opted to start with apixaban. Rate control strategy options including cardioversion, atrial fibrillation ablation, pacemaker with AVN ablation, anti-arrhythmic strategy, and rate control strategy were discussed with patient. Risks, benefits and alternative of each treatment options were explained. All questions were answered. We will monitor without antonio agents given asymptomatic nocturnal bradycardia. Information given to patient and family about above as well. - Two week monitor at time of discharge. - I will arrange follow up. - Continue apixaban 5 mg BID.  - No antonio agents, atrial fibrillation with slow response despite normal AVN function. - I will arrange follow up in my clinic.  - Outpatient referral to pulmonology for sleep apnea testing.  - Ok to discharge from EP and cardiology standpoint, we will sign off.    2. Malignant hypertension/hypertensive urgency. Significantly improved and being well managed by primary team.  Avoid any potential antonio agents (amlodipine is ok for use). - Per primary team.  - Avoid antonio agents. 3. Asymptomatic nocturnal bradycardia. Patient asymptomatic. Cardiac monitor as per above. - Cardiac monitor as per above.     Thank you for allowing me to participate in the care of [address] [place] [address] . If you have any questions/comments, please do not hesitate to contact us.     Barron Thompson MD  Cardiac Electrophysiology  [address]  [phone] [facility]

## 2021-09-18 NOTE — PLAN OF CARE
Problem: Infection:  Goal: Will remain free from infection  Description: Will remain free from infection  9/18/2021 0211 by Vicente Ryder RN  Outcome: Ongoing  9/18/2021 0209 by Vicente Ryder RN  Outcome: Ongoing  9/17/2021 1553 by Veronique Park RN  Outcome: Ongoing     Problem: Safety:  Goal: Free from accidental physical injury  Description: Free from accidental physical injury  9/18/2021 0211 by Vicente Ryder RN  Outcome: Ongoing  9/18/2021 0209 by Vicente Ryder RN  Outcome: Ongoing  9/17/2021 1553 by Veronique Park RN  Outcome: Ongoing  Goal: Free from intentional harm  Description: Free from intentional harm  9/18/2021 0211 by Vicente Ryder RN  Outcome: Ongoing  9/18/2021 0209 by Vicente Ryder RN  Outcome: Ongoing     Problem: Daily Care:  Goal: Daily care needs are met  Description: Daily care needs are met  9/18/2021 0211 by Vicente Ryder RN  Outcome: Ongoing  9/18/2021 0209 by Vicente Ryder RN  Outcome: Ongoing  9/17/2021 1553 by Veronique Park RN  Outcome: Ongoing     Problem: Pain:  Goal: Patient's pain/discomfort is manageable  Description: Patient's pain/discomfort is manageable  9/18/2021 0211 by Vicente Ryder RN  Outcome: Ongoing  9/18/2021 0209 by Vicente Ryder RN  Outcome: Ongoing     Problem: Skin Integrity:  Goal: Skin integrity will stabilize  Description: Skin integrity will stabilize  9/18/2021 0211 by Vicente Ryder RN  Outcome: Ongoing  9/18/2021 0209 by Vicente Ryder RN  Outcome: Ongoing     Problem: Discharge Planning:  Goal: Patients continuum of care needs are met  Description: Patients continuum of care needs are met  9/18/2021 0211 by Vicente Ryder RN  Outcome: Ongoing  9/18/2021 0209 by Vicente Ryder RN  Outcome: Ongoing     Problem: Falls - Risk of:  Goal: Will remain free from falls  Description: Will remain free from falls  9/18/2021 0211 by Vicente Ryder RN  Outcome: Ongoing  9/18/2021 0209 by Vicente Ryder RN  Outcome: Ongoing  Goal: Absence of physical injury  Description: Absence of physical injury  9/18/2021 0211 by Kadi Madsen RN  Outcome: Ongoing  9/18/2021 0209 by Kadi Madsen RN  Outcome: Ongoing

## 2021-09-18 NOTE — PROGRESS NOTES
Patient discharged. IV removed, telemetry box and leads removed and returned. Lockbox emptied. All belongings gathered and returned to patient. Discharge instructions reviewed with patient, all questions answered by RN. Prescriptions called into [facility] off [address] [place] [address]  No further needs.

## 2021-10-06 ENCOUNTER — TELEPHONE (OUTPATIENT)
Dept: CARDIOLOGY | Age: 76
End: 2021-10-06

## 2021-10-06 NOTE — TELEPHONE ENCOUNTER
Per Dr. Dick Pittman please set up in my clinic as a new patient. Patient has an appointment with Dr. Jori Crum on 10/8/21. Appointment could be made at that time.

## 2021-10-07 NOTE — PROGRESS NOTES
[facility]   CARDIAC EVALUATION NOTE  [phone]      PCP:  No primary care provider on file. Reason for Consultation/Chief Complaint: hospital follow up for HTN    Subjective   History of Present Illness:  Karrie Silva is a 78 y.o. patient with a history of HTN who presents for hospital follow up. Her history is very limited from the patient as she speaks only [place] and does not speak [place], limited history is from her son who also does have a language barrier. She was admitted 09/16-09/18/21 with complaints of chest pain. History is mainly per chart review, patient presented to emergency room yesterday with chest pain. She had run out of her medications for high blood pressure for 2 months and when she presented she was noted to have uncontrolled hypertension. She was admitted to the hospital, serial troponin levels were found to be negative. She underwent stress testing 09/17/21. During [facility] nuclear stress testing, a rapid response was called due to uncontrolled hypertension with dizziness and blurry vision with systolic pressure of 917. She was given sublingual nitroglycerin and with that her symptoms abated and her blood pressure improved. During the stresses, there were EKGs that raise concern for atrial fibrillation. Her echo from 09/18/21 showed her EF was 55-60%. Grade I DD. Mild MR and AR. Today her son states she lives with her son in law who is an RN. He checks her BP which is controlled. She denies CP, SOB, dizziness or syncope. She takes her medications as directed. She was not able to get her medications when she was in [facility]. Past Medical History:   has no past medical history on file. Surgical History:   has no past surgical history on file. Social History:   reports that she has never smoked. She has never used smokeless tobacco.     Family History:  family history is not on file.       Home Medications:  Were reviewed and are listed in nursing record and/or below  Prior to Admission medications    Medication Sig Start Date End Date Taking? Authorizing Provider   apixaban (ELIQUIS) 5 MG TABS tablet Take 1 tablet by mouth 2 times daily 9/18/21 10/18/21 Yes Judy Matamoros MD   atorvastatin (LIPITOR) 40 MG tablet Take 1 tablet by mouth nightly 9/18/21  Yes Judy Matamoros MD   amLODIPine (NORVASC) 10 MG tablet Take 1 tablet by mouth daily 9/19/21  Yes Judy Matamoros MD          Allergies:  Patient has no known allergies. Review of Systems:   A 14 point review of symptoms completed. Pertinent positives identified in the HPI, all other review of symptoms negative as below.       Objective   PHYSICAL EXAM:    Vitals:    10/08/21 1621   BP: 126/82   Pulse: 76   SpO2: 98%    Weight: 113 lb (51.3 kg)         General Appearance:  Alert, cooperative, no distress, appears stated age   Head:  Normocephalic, without obvious abnormality, atraumatic   Eyes:  PERRL, conjunctiva/corneas clear   Nose: Nares normal, no drainage or sinus tenderness   Throat: Lips, mucosa, and tongue normal   Neck: Supple, symmetrical, trachea midline, no adenopathy, thyroid: not enlarged, symmetric, no tenderness/mass/nodules, no carotid bruit or JVD   Lungs:   Clear to auscultation bilaterally, respirations unlabored   Chest Wall:  No deformity or tenderness   Heart:  Regular rate and rhythm, S1, S2 normal, no murmur, rub or gallop   Abdomen:   Soft, non-tender, bowel sounds active all four quadrants,  no masses, no organomegaly   Extremities: Extremities normal, atraumatic, no cyanosis or edema   Pulses: 2+ and symmetric   Skin: Skin color, texture, turgor normal, no rashes or lesions   Pysch: Normal mood and affect   Neurologic: Normal gross motor and sensory exam.         Labs   CBC:   Lab Results   Component Value Date    WBC 4.6 09/16/2021    RBC 4.89 09/16/2021    HGB 14.1 09/16/2021    HCT 42.0 09/16/2021    MCV 85.7 09/16/2021    RDW 13.4 09/16/2021     2021     CMP:  Lab Results   Component Value Date     2021    K 3.9 2021    CL 99 2021    CO2 28 2021    BUN 10 2021    CREATININE 0.7 2021    GFRAA >60 2021    AGRATIO 1.2 2021    LABGLOM >60 2021    GLUCOSE 98 2021    PROT 8.3 2021    CALCIUM 9.4 2021    BILITOT 0.5 2021    ALKPHOS 88 2021    AST 22 2021    ALT 10 2021     PT/INR:  No results found for: PTINR  HgBA1c:No results found for: LABA1C  Lab Results   Component Value Date    TROPONINI <0.01 2021         Cardiac Data     EK21  Afib HR 55    EK21  SR HR 71     Echo: 21  Summary   Normal left ventricular systolic function with ejection fraction of 55-60%. No regional wall motion abnormalites are seen. Mild concentric left ventricular hypertrophy. Grade I diastolic dysfunction with normal filling pressure. Mild mitral and aortic regurgitation. Systolic pulmonic artery pressure (SPAP) is normal estimated at 27 mmHg   (Right atrial pressure of 3 mmHg). Stress Test: 21  Normal myocardial perfusion study with normal left ventricular function,  size, and wall motion. The estimated left ventricular function is 75%. Recommendation  Hypertensive blood pressure response noted during testing. Cath:    Studies:       I have reviewed labs and imaging/xray/diagnostic testing in this note. Assessment      1. PAF (paroxysmal atrial fibrillation) ([facility] [address]    2. Primary hypertension    3. Mixed hyperlipidemia                 Plan   1. Continue current medications  2. Liver and lipids soon   3. Follow up in 1 year      Scribe's attestation: This note was scribed in the presence of Dr. Linda Ballesteros by Darek Cespedes RN    Thank you for allowing us to participate in the care of [address] [place] [address]. Please call me with any questions [phone].     Linda Ballesteros MD, [address] [facility] [address] [facility]  [phone] [facility]  [phone] [address] [place] [address]  10/8/2021 4:32 PM    I will address the patient's cardiac risk factors and adjusted pharmacologic treatment as needed. In addition, I have reinforced the need for patient directed risk factor modification. Tobacco use was discussed with the patient and educated on the negative effects and was asked not to use. All questions and concerns were addressed to the patient/family. Alternatives to my treatment were discussed. I, Dr Bc Camargo, personally performed the services described in this documentation, as scribed by the above signed scribe in my presence. It is both accurate and complete to my knowledge. I agree with the details independently gathered by the clinical support staff and the scribed note accurately describes my personal service to the patient.

## 2021-10-08 ENCOUNTER — OFFICE VISIT (OUTPATIENT)
Dept: CARDIOLOGY CLINIC | Age: 76
End: 2021-10-08
Payer: MEDICAID

## 2021-10-08 VITALS
HEIGHT: 62 IN | SYSTOLIC BLOOD PRESSURE: 126 MMHG | BODY MASS INDEX: 20.8 KG/M2 | HEART RATE: 76 BPM | DIASTOLIC BLOOD PRESSURE: 82 MMHG | OXYGEN SATURATION: 98 % | WEIGHT: 113 LBS

## 2021-10-08 DIAGNOSIS — I48.0 PAF (PAROXYSMAL ATRIAL FIBRILLATION) (HCC): Primary | ICD-10-CM

## 2021-10-08 DIAGNOSIS — I10 PRIMARY HYPERTENSION: ICD-10-CM

## 2021-10-08 DIAGNOSIS — E78.2 MIXED HYPERLIPIDEMIA: ICD-10-CM

## 2021-10-08 PROBLEM — E78.5 HLD (HYPERLIPIDEMIA): Status: ACTIVE | Noted: 2021-10-08

## 2021-10-08 PROCEDURE — 93272 ECG/REVIEW INTERPRET ONLY: CPT | Performed by: INTERNAL MEDICINE

## 2021-10-08 PROCEDURE — 1111F DSCHRG MED/CURRENT MED MERGE: CPT | Performed by: INTERNAL MEDICINE

## 2021-10-08 PROCEDURE — 99213 OFFICE O/P EST LOW 20 MIN: CPT | Performed by: INTERNAL MEDICINE

## 2021-10-08 RX ORDER — AMLODIPINE BESYLATE 10 MG/1
10 TABLET ORAL DAILY
Qty: 90 TABLET | Refills: 3 | Status: SHIPPED | OUTPATIENT
Start: 2021-10-08 | End: 2022-09-08

## 2021-10-08 RX ORDER — ATORVASTATIN CALCIUM 40 MG/1
40 TABLET, FILM COATED ORAL NIGHTLY
Qty: 90 TABLET | Refills: 3 | Status: SHIPPED | OUTPATIENT
Start: 2021-10-08

## 2021-10-13 DIAGNOSIS — I48.91 ATRIAL FIBRILLATION, UNSPECIFIED TYPE (HCC): ICD-10-CM

## 2022-09-07 DIAGNOSIS — E78.2 MIXED HYPERLIPIDEMIA: ICD-10-CM

## 2022-09-07 DIAGNOSIS — I10 PRIMARY HYPERTENSION: ICD-10-CM

## 2022-09-08 RX ORDER — AMLODIPINE BESYLATE 10 MG/1
10 TABLET ORAL DAILY
Qty: 90 TABLET | Refills: 3 | Status: SHIPPED | OUTPATIENT
Start: 2022-09-08

## 2022-09-08 NOTE — TELEPHONE ENCOUNTER
Pt/pharmacy requesting refill for amlodipine 10 mg tab. Pending script sent to [facility]. LOV 10/8/21  No upcoming OV scheduled at this time.

## 2023-03-10 ENCOUNTER — HOSPITAL ENCOUNTER (EMERGENCY)
Age: 78
Discharge: HOME | End: 2023-03-10
Attending: STUDENT IN AN ORGANIZED HEALTH CARE EDUCATION/TRAINING PROGRAM
Payer: MEDICAID

## 2023-03-10 ENCOUNTER — APPOINTMENT (OUTPATIENT)
Dept: GENERAL RADIOLOGY | Age: 78
End: 2023-03-10
Payer: MEDICAID

## 2023-03-10 VITALS
OXYGEN SATURATION: 96 % | HEIGHT: 62 IN | WEIGHT: 125 LBS | RESPIRATION RATE: 16 BRPM | HEART RATE: 57 BPM | DIASTOLIC BLOOD PRESSURE: 76 MMHG | SYSTOLIC BLOOD PRESSURE: 145 MMHG | BODY MASS INDEX: 23 KG/M2 | TEMPERATURE: 97.4 F

## 2023-03-10 DIAGNOSIS — R42 VERTIGO: Primary | ICD-10-CM

## 2023-03-10 LAB
A/G RATIO: 1.5 (ref 1.1–2.2)
ALBUMIN SERPL-MCNC: 4.5 G/DL (ref 3.4–5)
ALP BLD-CCNC: 74 U/L (ref 40–129)
ALT SERPL-CCNC: 19 U/L (ref 10–40)
ANION GAP SERPL CALCULATED.3IONS-SCNC: 12 MMOL/L (ref 3–16)
ANISOCYTOSIS: ABNORMAL
AST SERPL-CCNC: 28 U/L (ref 15–37)
ATYPICAL LYMPHOCYTE RELATIVE PERCENT: 13 % (ref 0–6)
BANDED NEUTROPHILS RELATIVE PERCENT: 1 % (ref 0–7)
BASOPHILS ABSOLUTE: 0 K/UL (ref 0–0.2)
BASOPHILS RELATIVE PERCENT: 0 %
BILIRUB SERPL-MCNC: 0.3 MG/DL (ref 0–1)
BUN BLDV-MCNC: 16 MG/DL (ref 7–20)
CALCIUM SERPL-MCNC: 9.4 MG/DL (ref 8.3–10.6)
CHLORIDE BLD-SCNC: 103 MMOL/L (ref 99–110)
CO2: 26 MMOL/L (ref 21–32)
CREAT SERPL-MCNC: 0.7 MG/DL (ref 0.6–1.2)
EOSINOPHILS ABSOLUTE: 0.5 K/UL (ref 0–0.6)
EOSINOPHILS RELATIVE PERCENT: 8 %
GFR SERPL CREATININE-BSD FRML MDRD: >60 ML/MIN/{1.73_M2}
GLUCOSE BLD-MCNC: 143 MG/DL (ref 70–99)
HCT VFR BLD CALC: 40.5 % (ref 36–48)
HEMATOLOGY PATH CONSULT: YES
HEMOGLOBIN: 13.6 G/DL (ref 12–16)
LYMPHOCYTES ABSOLUTE: 2.7 K/UL (ref 1–5.1)
LYMPHOCYTES RELATIVE PERCENT: 30 %
MACROCYTES: ABNORMAL
MCH RBC QN AUTO: 29 PG (ref 26–34)
MCHC RBC AUTO-ENTMCNC: 33.6 G/DL (ref 31–36)
MCV RBC AUTO: 86.3 FL (ref 80–100)
MICROCYTES: ABNORMAL
MONOCYTES ABSOLUTE: 0.6 K/UL (ref 0–1.3)
MONOCYTES RELATIVE PERCENT: 10 %
NEUTROPHILS ABSOLUTE: 2.5 K/UL (ref 1.7–7.7)
NEUTROPHILS RELATIVE PERCENT: 38 %
OVALOCYTES: ABNORMAL
PDW BLD-RTO: 13.4 % (ref 12.4–15.4)
PLATELET # BLD: 268 K/UL (ref 135–450)
PLATELET SLIDE REVIEW: ADEQUATE
PMV BLD AUTO: 9 FL (ref 5–10.5)
POIKILOCYTES: ABNORMAL
POTASSIUM REFLEX MAGNESIUM: 3.7 MMOL/L (ref 3.5–5.1)
RBC # BLD: 4.7 M/UL (ref 4–5.2)
SLIDE REVIEW: ABNORMAL
SODIUM BLD-SCNC: 141 MMOL/L (ref 136–145)
TOTAL PROTEIN: 7.6 G/DL (ref 6.4–8.2)
TROPONIN: <0.01 NG/ML
WBC # BLD: 6.3 K/UL (ref 4–11)

## 2023-03-10 PROCEDURE — 84484 ASSAY OF TROPONIN QUANT: CPT

## 2023-03-10 PROCEDURE — 85025 COMPLETE CBC W/AUTO DIFF WBC: CPT

## 2023-03-10 PROCEDURE — 80053 COMPREHEN METABOLIC PANEL: CPT

## 2023-03-10 PROCEDURE — 93005 ELECTROCARDIOGRAM TRACING: CPT | Performed by: PHYSICIAN ASSISTANT

## 2023-03-10 PROCEDURE — 99285 EMERGENCY DEPT VISIT HI MDM: CPT

## 2023-03-10 PROCEDURE — 71045 X-RAY EXAM CHEST 1 VIEW: CPT

## 2023-03-10 ASSESSMENT — PAIN - FUNCTIONAL ASSESSMENT
PAIN_FUNCTIONAL_ASSESSMENT: NONE - DENIES PAIN
PAIN_FUNCTIONAL_ASSESSMENT: NONE - DENIES PAIN

## 2023-03-10 NOTE — ED NOTES
Difficult to obtain accurate information from patient d/t language barrier/poor historian. Pt reports having episode of dizziness that started while putting dishes away. Went away with rest. States feels like room is spinning.       Yvon Mclean RN  03/10/23 6012

## 2023-03-11 LAB
EKG ATRIAL RATE: 71 BPM
EKG DIAGNOSIS: NORMAL
EKG P AXIS: 42 DEGREES
EKG P-R INTERVAL: 156 MS
EKG Q-T INTERVAL: 420 MS
EKG QRS DURATION: 96 MS
EKG QTC CALCULATION (BAZETT): 456 MS
EKG R AXIS: -51 DEGREES
EKG T AXIS: 55 DEGREES
EKG VENTRICULAR RATE: 71 BPM

## 2023-03-11 NOTE — ED NOTES
Dc instructions given and reviewed with pt/family. Verbalized understanding of instructions , no other needs voiced. Pt ambulatory with steady gait from department.      Yuki Tate RN  03/10/23 4323

## 2023-03-11 NOTE — ED PROVIDER NOTES
[facility]  ED  EMERGENCY DEPARTMENT ENCOUNTER        Pt Name: Shana New  MRN: [id number]  Ceciliagfdavid 1945  Date of evaluation: 3/10/2023  Provider: ASHLEY Gomez  PCP: No primary care provider on file. Note Started: 9:39 PM EST 3/10/23       I have seen and evaluated this patient with my supervising physician No att. providers found. CHIEF COMPLAINT       Chief Complaint   Patient presents with    Dizziness     Via the : \"I feel dizzy\". Onset < 1 hour ago. Hx HTN. Feels like room is spinning. HISTORY OF PRESENT ILLNESS: 1 or more Elements     History from : Patient and Family son. Limitations to history : Language Ca Khoury is a 78 y.o. female who presents via EMS complaining of dizziness. The patient does require a , she does not speak any [place]. Her son does present to the emergency department with her and is able to translate. Initially the iPad  was used however the patient became very frustrated with the iPad  and requested that her son translate for her. The patient was home cooking when she suddenly became dizzy and sat down on the floor. She had 2 episodes of this dizziness. She is unable to tell me how long the episodes lasted. She states another son was home with her however did not witness these episodes. She called him who called 911. Patient is currently denying any symptoms. She denies any past medical history other than she was hospitalized last year    Nursing Notes were all reviewed and agreed with or any disagreements were addressed in the HPI. REVIEW OF SYSTEMS :      Review of Systems    Positives and Pertinent negatives as per HPI. SURGICAL HISTORY   History reviewed. No pertinent surgical history.     CURRENTMEDICATIONS       Previous Medications    AMLODIPINE (NORVASC) 10 MG TABLET    TAKE 1 TABLET BY MOUTH DAILY    ATORVASTATIN (LIPITOR) 40 MG TABLET    Take 1 tablet by mouth nightly       ALLERGIES     Patient has no known allergies. FAMILYHISTORY     History reviewed. No pertinent family history. SOCIAL HISTORY       Social History     Tobacco Use    Smoking status: Never    Smokeless tobacco: Never   Vaping Use    Vaping Use: Never used   Substance Use Topics    Alcohol use: Never    Drug use: Never       SCREENINGS        [place] Coma Scale  Eye Opening: Spontaneous  Best Verbal Response: Oriented  Best Motor Response: Obeys commands  [place] Coma Scale Score: 15                CIWA Assessment  BP: (!) 145/76  Heart Rate: 57           PHYSICAL EXAM  1 or more Elements     ED Triage Vitals   BP Temp Temp Source Heart Rate Resp SpO2 Height Weight   03/10/23 1518 03/10/23 1518 03/10/23 1518 03/10/23 1518 03/10/23 1518 03/10/23 1518 03/10/23 1527 03/10/23 1527   (!) 145/75 97.4 °F (36.3 °C) Oral 71 18 97 % 5' 2\" (1.575 m) 125 lb (56.7 kg)       Physical Exam  Vitals and nursing note reviewed. Constitutional:       Appearance: Normal appearance. She is not diaphoretic. HENT:      Head: Normocephalic and atraumatic. Nose: Nose normal.      Mouth/Throat:      Mouth: Mucous membranes are moist.   Eyes:      General:         Right eye: No discharge. Left eye: No discharge. Extraocular Movements: Extraocular movements intact. Pupils: Pupils are equal, round, and reactive to light. Cardiovascular:      Rate and Rhythm: Normal rate and regular rhythm. Pulses: Normal pulses. Heart sounds: Normal heart sounds. No murmur heard. No friction rub. No gallop. Pulmonary:      Effort: Pulmonary effort is normal. No respiratory distress. Breath sounds: Normal breath sounds. No stridor. No wheezing, rhonchi or rales. Abdominal:      General: Abdomen is flat. Palpations: Abdomen is soft. Tenderness: There is no abdominal tenderness. There is no guarding or rebound.    Musculoskeletal:         General: Normal range of motion. Cervical back: Normal range of motion and neck supple. Skin:     General: Skin is warm and dry. Coloration: Skin is not pale. Neurological:      General: No focal deficit present. Mental Status: She is alert and oriented to person, place, and time. GCS: GCS eye subscore is 4. GCS verbal subscore is 5. GCS motor subscore is 6. Cranial Nerves: Cranial nerves 2-12 are intact. Sensory: Sensation is intact. Motor: Motor function is intact. Coordination: Coordination is intact. Gait: Gait is intact. Psychiatric:         Mood and Affect: Mood normal.         Behavior: Behavior normal.       ***    DIAGNOSTIC RESULTS   LABS:    Labs Reviewed   CBC WITH AUTO DIFFERENTIAL - Abnormal; Notable for the following components:       Result Value    Atypical Lymphocytes Relative 13 (*)     Anisocytosis Occasional (*)     Macrocytes Occasional (*)     Microcytes Occasional (*)     Poikilocytes Occasional (*)     Ovalocytes Occasional (*)     All other components within normal limits   COMPREHENSIVE METABOLIC PANEL W/ REFLEX TO MG FOR LOW K - Abnormal; Notable for the following components:    Glucose 143 (*)     All other components within normal limits   TROPONIN       When ordered only abnormal lab results are displayed. All other labs were within normal range or not returned as of this dictation. EKG: When ordered, EKG's are interpreted by the Emergency Department Physician in the absence of a cardiologist.  Please see their note for interpretation of EKG.     RADIOLOGY:   Non-plain film images such as CT, Ultrasound and MRI are read by the radiologist. Plain radiographic images are visualized and preliminarily interpreted by the ED Provider with the below findings:    ***    Interpretation per the Radiologist below, if available at the time of this note:    XR CHEST PORTABLE   Final Result   Stable mild cardiomegaly with mild central pulmonary congestion Severe hypoinflation with mild bibasilar discoid atelectasis or scarring   which is more prominent. XR CHEST PORTABLE    Result Date: 3/10/2023  EXAMINATION: ONE XRAY VIEW OF THE CHEST 3/10/2023 5:09 pm COMPARISON: 09/16/2021 HISTORY: ORDERING SYSTEM PROVIDED HISTORY: dizzy TECHNOLOGIST PROVIDED HISTORY: Reason for exam:->dizzy Reason for Exam: dizzy FINDINGS: The heart is mildly enlarged but unchanged. The pulmonary vessels are slightly prominent centrally. The lungs are severely hypoinflated mild linear densities along the lung bases which is more apparent. No obvious consolidation or effusion is seen. The bones are intact. Stable mild cardiomegaly with mild central pulmonary congestion Severe hypoinflation with mild bibasilar discoid atelectasis or scarring which is more prominent. No results found. PROCEDURES   Unless otherwise noted below, none     Procedures    CRITICAL CARE TIME (.cctime)   ***    PAST MEDICAL HISTORY      has a past medical history of Hypertension. Chronic Conditions affecting Care: ***    EMERGENCY DEPARTMENT COURSE and DIFFERENTIAL DIAGNOSIS/MDM:   Vitals:    Vitals:    03/10/23 1842 03/10/23 1922 03/10/23 1953 03/10/23 2052   BP:  132/81 (!) 134/90 (!) 145/76   Pulse:  62 67 57   Resp:  18 18 16   Temp:       TempSrc:       SpO2: 95% 96% 96% 96%   Weight:       Height:           Patient was given the following medications:  Medications - No data to display          Is this patient to be included in the SEP-1 Core Measure due to severe sepsis or septic shock?    {[id number]:38765}    CONSULTS: (Who and What was discussed)  None  {Discussion with Other Profesionals (Optional):22130}    {Social Determinants (Optional):20279}    {Records Reviewed (Optional):65126}    CC/HPI Summary, DDx, ED Course, and Reassessment: ***    Disposition Considerations (include 1 Tests not done, Shared Decision Making, Pt Expectation of Test or Tx.): ***  {Escalation of care, 03/10/23 1842 03/10/23 1922 03/10/23 1953 03/10/23 2052   BP:  132/81 (!) 134/90 (!) 145/76   Pulse:  62 67 57   Resp:  18 18 16   Temp:       TempSrc:       SpO2: 95% 96% 96% 96%   Weight:       Height:           Patient was given the following medications:  Medications - No data to display          Is this patient to be included in the SEP-1 Core Measure due to severe sepsis or septic shock? No   Exclusion criteria - the patient is NOT to be included for SEP-1 Core Measure due to: Infection is not suspected    CONSULTS: (Who and What was discussed)  None      Social Determinants : None    Records Reviewed : None    CC/HPI Summary, DDx, ED Course, and Reassessment: Patient was evaluated in the emergency department today for dizziness. Neurologic exam is very reassuring. Differential diagnosis includes TIA, CVA, BPPV, or cardiac abnormality. However based on my evaluation I have very low clinical suspicion for TIA or CVA and suspect symptoms are more related to BPPV. Work-up results include:  CBC without evidence of leukocytosis or acute anemia  CMP without acute electrolyte abnormality maintain renal function. Troponin is less than 0.01  Chest x-ray shows stable mild cardiomegaly with central pulmonary congestion. EKG was interpreted by attending physician found abnormal sinus rhythm. Disposition Considerations (include 1 Tests not done, Shared Decision Making, Pt Expectation of Test or Tx.): Patient will follow-up with her primary care physician for further evaluation and treatment. Return precautions are discussed.       Results for orders placed or performed during the hospital encounter of 03/10/23   CBC with Auto Differential   Result Value Ref Range    WBC 6.3 4.0 - 11.0 K/uL    RBC 4.70 4.00 - 5.20 M/uL    Hemoglobin 13.6 12.0 - 16.0 g/dL    Hematocrit 40.5 36.0 - 48.0 %    MCV 86.3 80.0 - 100.0 fL    MCH 29.0 26.0 - 34.0 pg    MCHC 33.6 31.0 - 36.0 g/dL    RDW 13.4 12.4 - 15.4 % Platelets 695 211 - 203 K/uL    MPV 9.0 5.0 - 10.5 fL    PLATELET SLIDE REVIEW Adequate     SLIDE REVIEW see below     Path Consult Yes     Neutrophils % 38.0 %    Lymphocytes % 30.0 %    Monocytes % 10.0 %    Eosinophils % 8.0 %    Basophils % 0.0 %    Neutrophils Absolute 2.5 1.7 - 7.7 K/uL    Lymphocytes Absolute 2.7 1.0 - 5.1 K/uL    Monocytes Absolute 0.6 0.0 - 1.3 K/uL    Eosinophils Absolute 0.5 0.0 - 0.6 K/uL    Basophils Absolute 0.0 0.0 - 0.2 K/uL    Bands Relative 1 0 - 7 %    Atypical Lymphocytes Relative 13 (H) 0 - 6 %    Anisocytosis Occasional (A)     Macrocytes Occasional (A)     Microcytes Occasional (A)     Poikilocytes Occasional (A)     Ovalocytes Occasional (A)    CMP w/ Reflex to MG   Result Value Ref Range    Sodium 141 136 - 145 mmol/L    Potassium reflex Magnesium 3.7 3.5 - 5.1 mmol/L    Chloride 103 99 - 110 mmol/L    CO2 26 21 - 32 mmol/L    Anion Gap 12 3 - 16    Glucose 143 (H) 70 - 99 mg/dL    BUN 16 7 - 20 mg/dL    Creatinine 0.7 0.6 - 1.2 mg/dL    Est, Glom Filt Rate >60 >60    Calcium 9.4 8.3 - 10.6 mg/dL    Total Protein 7.6 6.4 - 8.2 g/dL    Albumin 4.5 3.4 - 5.0 g/dL    Albumin/Globulin Ratio 1.5 1.1 - 2.2    Total Bilirubin 0.3 0.0 - 1.0 mg/dL    Alkaline Phosphatase 74 40 - 129 U/L    ALT 19 10 - 40 U/L    AST 28 15 - 37 U/L   Troponin   Result Value Ref Range    Troponin <0.01 <0.01 ng/mL   Blood Smear Review   Result Value Ref Range    Path Consult Reviewed    EKG 12 Lead   Result Value Ref Range    Ventricular Rate 71 BPM    Atrial Rate 71 BPM    P-R Interval 156 ms    QRS Duration 96 ms    Q-T Interval 420 ms    QTc Calculation (Bazett) 456 ms    P Axis 42 degrees    R Axis -51 degrees    T Axis 55 degrees    Diagnosis       Normal sinus rhythmLeft axis deviationPossible Anterior infarct , age undeterminedAbnormal ECGConfirmed by Lia Gotti MD, [facility] ([id number]) on 3/11/2023 9:47:56 AM       I estimate there is LOW risk for ACUTE CORONARY SYNDROME, INTRACRANIAL HEMORRHAGE, MALIGNANT DYSRHYTHMIA, MENINGITIS, PNEUMONIA, PULMONARY EMBOLISM, SEPSIS, SUBARACHNOID HEMORRHAGE, SUBDURAL HEMATOMA, STROKE, or URINARY TRACT INFECTION, thus I consider the discharge disposition reasonable. 42 Katharine Hammond and I have discussed the diagnosis and risks, and we agree with discharging home to follow-up with their primary doctor. We also discussed returning to the Emergency Department immediately if new or worsening symptoms occur. We have discussed the symptoms which are most concerning (e.g., changing or worsening pain, weakness, vomiting, fever) that necessitate immediate return. Final Impression    1. Vertigo        Blood pressure (!) 145/76, pulse 57, temperature 97.4 °F (36.3 °C), temperature source Oral, resp. rate 16, height 5' 2\" (1.575 m), weight 125 lb (56.7 kg), SpO2 96 %. I am the Primary Clinician of Record. FINAL IMPRESSION      1.  Vertigo          DISPOSITION/PLAN     DISPOSITION Decision To Discharge 03/10/2023 09:30:32 PM      PATIENT REFERRED TO:  [facility]  ED  [address]    If symptoms worsen    [facility]  [phone]        DISCHARGE MEDICATIONS:  Discharge Medication List as of 3/10/2023  9:50 PM          DISCONTINUED MEDICATIONS:  Discharge Medication List as of 3/10/2023  9:50 PM                 (Please note that portions of this note were completed with a voice recognition program.  Efforts were made to edit the dictations but occasionally words are mis-transcribed.)    ASHLEY Monique (electronically signed)           ASHLEY Monique  03/13/23 5670

## 2023-03-12 NOTE — ED PROVIDER NOTES
Attending Supervisory Note/Shared Visit     I personally saw the patient and performed a substantive portion of the visit including all aspects of the medical decision making as addressed:      53-year-old woman who is [place] speaking who presents with episodic vertigo that started just prior to arrival.  Now resolved. Patient states that she had another episode earlier in the day after she stood up. She states that these episodes are short-lived, completely resolved, or associated with nausea, has no nausea or emesis currently. She is GCS 15, NIH stroke scale is 0, she has a negative test of skew and a steady gait in the emergency department. Doubt TIA or CVA based on my discussion with the patient and her reassuring physical exam, likely BPPV, no evidence of acute ear infection, electrolyte, cardiac work-up unremarkable. Patient states that when she was admitted last year for different problem, she did not follow-up with a PCP because she felt better. She is recommended to follow-up with a primary care provider after this despite feeling better, she voices understanding. Stable for and amenable to discharge home. I estimate there is LOW risk for (including but not limited to) INTRACRANIAL HEMORRHAGE, ISCHEMIC CVA,  MENINGITIS, ACUTE CORONARY SYNDROME, MALIGNANT DYSRHYTHMIA, PULMONARY EMBOLISM, PNEUMONIA or SEPSIS, thus I consider the discharge disposition reasonable. Discharge is especially supported since the patient has improvement of presenting symptoms, demonstrates steady ambulation at their baseline in the emergency department on my reassessment, persists to have no focal neurologic deficits on my reexamination, and demonstrates robust p.o. tolerance. 42 Katharine Hammond (or their surrogate) and I have discussed the diagnosis and risks, and we agree with discharging home with close follow-up.      We also discussed returning to the Emergency Department immediately if new or worsening symptoms occur. We have discussed the symptoms which are most concerning that necessitate immediate return. The Ekg interpreted by me shows  Rhythm NSR  Rate of 71 bpm  Axis is  left axis deviation  Intervals and durations normal  ST Segments: nonspecific ST abnl. No acute change. Compared to prior EKG dated 9/18/21, no ectopic beats present. FINAL IMPRESSION      1.  Vertigo          DISPOSITION/PLAN   DISPOSITION Decision To Discharge 03/10/2023 09:30:32 PM        Amari Herndon MD  Attending Emergency Physician        Amari Herndon MD  03/12/23 1945

## 2023-03-13 LAB — HEMATOLOGY PATH CONSULT: NORMAL

## 2023-10-09 DIAGNOSIS — I10 PRIMARY HYPERTENSION: ICD-10-CM

## 2023-10-09 DIAGNOSIS — E78.2 MIXED HYPERLIPIDEMIA: ICD-10-CM

## 2023-10-09 RX ORDER — AMLODIPINE BESYLATE 10 MG/1
10 TABLET ORAL DAILY
Qty: 90 TABLET | Refills: 0 | Status: SHIPPED | OUTPATIENT
Start: 2023-10-09

## 2023-10-09 NOTE — TELEPHONE ENCOUNTER
10/9 Called [phone]. Spoke to pt son. Scheduled pt with srj for first avail on 11/10/23 at 10:15am.  Son stated that pt is completely out of medication. Please send enough to get to appt.

## 2023-10-09 NOTE — TELEPHONE ENCOUNTER
LOV 10/08/2021    Front- please call patient to schedule f/u for medication refills or let pt know we cannot refill unless seen in office and another provider would have to fill if needed.

## 2024-01-07 DIAGNOSIS — I10 PRIMARY HYPERTENSION: ICD-10-CM

## 2024-01-07 DIAGNOSIS — E78.2 MIXED HYPERLIPIDEMIA: ICD-10-CM

## 2024-01-08 NOTE — TELEPHONE ENCOUNTER
10/8/2021 SRJ  No upcoming appt  3/10/2023 cmp  Please contact pt for yearly appt/med refills appt.

## 2024-01-11 NOTE — TELEPHONE ENCOUNTER
01/11 sencond attempt,   called pt @   [phone] (Home Phone)   States # is unreachable, next step will be to mail letter

## 2024-01-15 DIAGNOSIS — E78.2 MIXED HYPERLIPIDEMIA: ICD-10-CM

## 2024-01-15 DIAGNOSIS — I10 PRIMARY HYPERTENSION: ICD-10-CM

## 2024-01-15 RX ORDER — AMLODIPINE BESYLATE 10 MG/1
10 TABLET ORAL DAILY
Qty: 30 TABLET | Refills: 0 | Status: SHIPPED | OUTPATIENT
Start: 2024-01-15

## 2024-01-15 NOTE — TELEPHONE ENCOUNTER
01/15 attempted to contact pt, person who answered stated she is in [place] and will not return for another 2 weeks. Informed to have patient call and schedule appointment. Mailing letter.

## 2024-01-16 RX ORDER — AMLODIPINE BESYLATE 10 MG/1
10 TABLET ORAL DAILY
Qty: 90 TABLET | OUTPATIENT
Start: 2024-01-16

## 2024-01-16 NOTE — TELEPHONE ENCOUNTER
10/8/2021 SRJ  No upcoming appt  3/10/2023 cmp  Please contact pt for yearly appt/med refills appt.   Multiple attempts made to contact pt for an appt, letter was mailed 1/15/2024

## 2024-05-28 DIAGNOSIS — I10 PRIMARY HYPERTENSION: ICD-10-CM

## 2024-05-28 DIAGNOSIS — E78.2 MIXED HYPERLIPIDEMIA: ICD-10-CM

## 2024-05-29 RX ORDER — AMLODIPINE BESYLATE 10 MG/1
10 TABLET ORAL DAILY
Qty: 30 TABLET | Refills: 0 | Status: SHIPPED | OUTPATIENT
Start: 2024-05-29

## 2024-05-31 NOTE — PROGRESS NOTES
prescribed  Recommend Echo for A-fib, abnormal EKG, shortness of breath  Recommend 2 week cardiac event monitor for A-fib  Labs today (OK to do non-fasting)  Follow up yearly with me           Scribe's attestation:  This note was scribed in the presence of Dr. Shiva Adler MD by Pat Pizano RN      I, Dr Shiva Adler, personally performed the services described in this documentation, as scribed by the above signed scribe in my presence.  It is both accurate and complete to my knowledge.  I agree with the details independently gathered by the clinical support staff and the scribed note accurately describes my personal service to the patient.      Shiva Adler MD, [facility]   Interventional Cardiologist  [facility]  [phone] [place] Office  [phone] [place] Office  6/4/2024 10:13 AM

## 2024-06-04 ENCOUNTER — OFFICE VISIT (OUTPATIENT)
Dept: CARDIOLOGY CLINIC | Age: 79
End: 2024-06-04
Payer: MEDICAID

## 2024-06-04 ENCOUNTER — TELEPHONE (OUTPATIENT)
Dept: CARDIOLOGY CLINIC | Age: 79
End: 2024-06-04

## 2024-06-04 ENCOUNTER — ANCILLARY PROCEDURE (OUTPATIENT)
Dept: CARDIOLOGY CLINIC | Age: 79
End: 2024-06-04
Payer: MEDICAID

## 2024-06-04 VITALS
BODY MASS INDEX: 22.82 KG/M2 | HEART RATE: 80 BPM | SYSTOLIC BLOOD PRESSURE: 112 MMHG | WEIGHT: 124 LBS | DIASTOLIC BLOOD PRESSURE: 62 MMHG | OXYGEN SATURATION: 100 % | HEIGHT: 62 IN

## 2024-06-04 DIAGNOSIS — I10 PRIMARY HYPERTENSION: Primary | ICD-10-CM

## 2024-06-04 DIAGNOSIS — I48.0 PAROXYSMAL ATRIAL FIBRILLATION (HCC): ICD-10-CM

## 2024-06-04 DIAGNOSIS — I48.0 PAF (PAROXYSMAL ATRIAL FIBRILLATION) (HCC): ICD-10-CM

## 2024-06-04 DIAGNOSIS — I48.91 ATRIAL FIBRILLATION, UNSPECIFIED TYPE (HCC): ICD-10-CM

## 2024-06-04 DIAGNOSIS — R06.02 SHORTNESS OF BREATH: ICD-10-CM

## 2024-06-04 DIAGNOSIS — E78.2 MIXED HYPERLIPIDEMIA: ICD-10-CM

## 2024-06-04 PROCEDURE — 99214 OFFICE O/P EST MOD 30 MIN: CPT | Performed by: INTERNAL MEDICINE

## 2024-06-04 PROCEDURE — 93270 REMOTE 30 DAY ECG REV/REPORT: CPT | Performed by: INTERNAL MEDICINE

## 2024-06-04 PROCEDURE — 93000 ELECTROCARDIOGRAM COMPLETE: CPT | Performed by: INTERNAL MEDICINE

## 2024-06-04 PROCEDURE — 3074F SYST BP LT 130 MM HG: CPT | Performed by: INTERNAL MEDICINE

## 2024-06-04 PROCEDURE — 1123F ACP DISCUSS/DSCN MKR DOCD: CPT | Performed by: INTERNAL MEDICINE

## 2024-06-04 PROCEDURE — 3078F DIAST BP <80 MM HG: CPT | Performed by: INTERNAL MEDICINE

## 2024-06-04 RX ORDER — ATORVASTATIN CALCIUM 40 MG/1
40 TABLET, FILM COATED ORAL NIGHTLY
Qty: 90 TABLET | Refills: 3 | Status: SHIPPED | OUTPATIENT
Start: 2024-06-04

## 2024-06-04 RX ORDER — AMLODIPINE BESYLATE 10 MG/1
10 TABLET ORAL DAILY
Qty: 90 TABLET | Refills: 3 | Status: SHIPPED | OUTPATIENT
Start: 2024-06-04

## 2024-06-04 NOTE — PATIENT INSTRUCTIONS
Plan   Discussed need for being on Eliquis due to being in atrial fibrillation   -referral to EP to establish care   Start Eliquis 5mg twice a day  -dicussed potential Watchman referral   Continue other cardiac medications as prescribed  Recommend Echo   Recommend 2 week cardiac event monitor   Follow up yearly with me       Your provider has ordered testing for further evaluation.  An order/prescription has been included in your paper work.   To schedule outpatient testing, contact Central [facility] by calling [facility] ([phone]).

## 2024-06-04 NOTE — TELEPHONE ENCOUNTER
Monitor placed by ES  Monitor company [employer]  Length of monitor 14 days  Monitor ordered by SRJ  Kit Names [id number]  Patch ID [id number]  Activation successful prior to pt leaving office? Yes

## 2024-06-07 ENCOUNTER — TELEPHONE (OUTPATIENT)
Dept: CARDIOLOGY CLINIC | Age: 79
End: 2024-06-07

## 2024-06-07 NOTE — TELEPHONE ENCOUNTER
Let pt know nocturnal pause noted, if she is willing can continue to monitor this and rec: EP f/u at next available (nonurgent) appt. Thank you.

## 2024-06-13 NOTE — TELEPHONE ENCOUNTER
Called and spoke to son Jeffrey per HIPAA; He refused  services and requested results.  Results given, he VU. Follow up appt with EP already scheduled.

## 2024-06-24 LAB — ECHO BSA: 1.57 M2

## 2024-06-24 PROCEDURE — 93272 ECG/REVIEW INTERPRET ONLY: CPT | Performed by: INTERNAL MEDICINE

## 2024-07-01 ENCOUNTER — TELEPHONE (OUTPATIENT)
Dept: CARDIOLOGY CLINIC | Age: 79
End: 2024-07-01

## 2024-07-01 NOTE — TELEPHONE ENCOUNTER
Attempted to call patients son per HIPAA form to discuss results at son request; unable to LV. Staff will re attempt at a later time.

## 2024-07-01 NOTE — TELEPHONE ENCOUNTER
----- Message from Shiva Adler MD sent at 6/25/2024 10:07 AM EDT -----  Let patient know their event monitor test shows pafib, rec: continue w/ eliquis, lets make sure she is taking that, may need to d/w her son that and lets make sure she has EP and watchman clinic f/u.   Thanks.

## 2025-05-24 DIAGNOSIS — I10 PRIMARY HYPERTENSION: ICD-10-CM

## 2025-05-24 DIAGNOSIS — E78.2 MIXED HYPERLIPIDEMIA: ICD-10-CM

## 2025-05-27 DIAGNOSIS — I10 PRIMARY HYPERTENSION: ICD-10-CM

## 2025-05-27 DIAGNOSIS — E78.2 MIXED HYPERLIPIDEMIA: ICD-10-CM

## 2025-05-27 RX ORDER — AMLODIPINE BESYLATE 10 MG/1
10 TABLET ORAL DAILY
Qty: 30 TABLET | Refills: 1 | Status: SHIPPED | OUTPATIENT
Start: 2025-05-27 | End: 2025-05-27

## 2025-05-27 RX ORDER — AMLODIPINE BESYLATE 10 MG/1
10 TABLET ORAL DAILY
Qty: 30 TABLET | Refills: 1 | Status: SHIPPED | OUTPATIENT
Start: 2025-05-27

## 2025-05-27 RX ORDER — AMLODIPINE BESYLATE 10 MG/1
10 TABLET ORAL DAILY
Qty: 90 TABLET | OUTPATIENT
Start: 2025-05-27

## 2025-05-27 NOTE — TELEPHONE ENCOUNTER
NETO RX pending     Last Office Visit: 6/4/2024 Provider: NETO  **Is provider OOT? No    Next Office Visit: 7/25/2025 Provider: NETO      Lab orders needed? no   Encounter provider correct? Yes If not, change provider  Script changes since last refill? no

## 2025-05-27 NOTE — TELEPHONE ENCOUNTER
Last Office Visit: 6/4/2024 Provider: NETO  **Is provider OOT? No      **If no OV, when does pt need to be seen? in 1 month  **Has patient already had 30 day supply? No    Lab orders needed? no - already ordered.   Encounter provider correct? yes If not, change provider  Script changes since last refill? no